# Patient Record
Sex: FEMALE | Race: WHITE | Employment: OTHER | URBAN - METROPOLITAN AREA
[De-identification: names, ages, dates, MRNs, and addresses within clinical notes are randomized per-mention and may not be internally consistent; named-entity substitution may affect disease eponyms.]

---

## 2019-04-29 ENCOUNTER — OFFICE VISIT (OUTPATIENT)
Dept: INFECTIOUS DISEASES | Age: 74
End: 2019-04-29
Payer: MEDICARE

## 2019-04-29 VITALS — HEART RATE: 64 BPM | SYSTOLIC BLOOD PRESSURE: 128 MMHG | DIASTOLIC BLOOD PRESSURE: 60 MMHG | TEMPERATURE: 98 F

## 2019-04-29 DIAGNOSIS — Z87.440 HISTORY OF RECURRENT UTIS: Primary | ICD-10-CM

## 2019-04-29 DIAGNOSIS — Z88.1 ALLERGY TO MULTIPLE ANTIBIOTICS: ICD-10-CM

## 2019-04-29 DIAGNOSIS — R82.71 BACTERIURIA: ICD-10-CM

## 2019-04-29 PROCEDURE — 3017F COLORECTAL CA SCREEN DOC REV: CPT | Performed by: INTERNAL MEDICINE

## 2019-04-29 PROCEDURE — G8427 DOCREV CUR MEDS BY ELIG CLIN: HCPCS | Performed by: INTERNAL MEDICINE

## 2019-04-29 PROCEDURE — 4004F PT TOBACCO SCREEN RCVD TLK: CPT | Performed by: INTERNAL MEDICINE

## 2019-04-29 PROCEDURE — 1123F ACP DISCUSS/DSCN MKR DOCD: CPT | Performed by: INTERNAL MEDICINE

## 2019-04-29 PROCEDURE — G8399 PT W/DXA RESULTS DOCUMENT: HCPCS | Performed by: INTERNAL MEDICINE

## 2019-04-29 PROCEDURE — 1090F PRES/ABSN URINE INCON ASSESS: CPT | Performed by: INTERNAL MEDICINE

## 2019-04-29 PROCEDURE — 99205 OFFICE O/P NEW HI 60 MIN: CPT | Performed by: INTERNAL MEDICINE

## 2019-04-29 PROCEDURE — G8421 BMI NOT CALCULATED: HCPCS | Performed by: INTERNAL MEDICINE

## 2019-04-29 PROCEDURE — 4040F PNEUMOC VAC/ADMIN/RCVD: CPT | Performed by: INTERNAL MEDICINE

## 2019-04-29 RX ORDER — ATORVASTATIN CALCIUM 20 MG/1
20 TABLET, FILM COATED ORAL DAILY
COMMUNITY

## 2019-04-29 RX ORDER — TRAZODONE HYDROCHLORIDE 50 MG/1
50 TABLET ORAL NIGHTLY
Status: ON HOLD | COMMUNITY
End: 2022-06-20

## 2019-04-29 NOTE — PROGRESS NOTES
Infectious Diseases Consult Note    Reason for Consult:   Recurrent UTI   Requesting Physician:   Urology - Dr Lyndsey Gonzales  Primary Care Physician:  Neysa Cheadle, MD  History Obtained From:   Patient, EPIC    CHIEF COMPLAINT:      Chief Complaint   Patient presents with    New Patient       HISTORY OF PRESENT ILLNESS:      73 yo HTN, high lipid, hx breast ca, DM, CVA 1/11/19 (after hip surg / [facility])    Hx UTIs over 3-4 years - no symptoms. May have frequency. No hx dysuria, hematuria. No abd pain / no back pain. Has had E coli, resistant. Has 'allergies', see below. Macrobid 'does not work'. Today - pt feels fine.     Reviewed antibiotic -  PCN - age 25, had cold, shot / pills, 'hives'  Cephalexin - rash on arm with injection, she thinks  Cipro - doesn't remember, may have had swelling in upper mid gingiva  Bactrim - doesn't remember; quit working      Past Medical History:    Past Medical History:   Diagnosis Date    Cancer ([facility] [address]     breast    Glaucoma     Hyperlipidemia     Hypertension     CAROLINA (nonalcoholic steatohepatitis)     PPD positive     Type II or unspecified type diabetes mellitus without mention of complication, not stated as uncontrolled ([address]        Past Surgical History:    Past Surgical History:   Procedure Laterality Date    APPENDECTOMY  14 years ago    BREAST BIOPSY  2000    left    BREAST SURGERY      bilateral mastectomy    COLONOSCOPY  8/30/2011    DILATION AND CURETTAGE OF UTERUS  1975    JOINT REPLACEMENT Right     hip    OTHER SURGICAL HISTORY  4/4/16    excision left lower abdominal wall mass, excision right lower leg mass    TONSILLECTOMY  5 years ago       Current Medications:    Current Outpatient Medications   Medication Sig Dispense Refill    aspirin 81 MG tablet Take 81 mg by mouth daily      traZODone (DESYREL) 50 MG tablet Take 50 mg by mouth nightly      atorvastatin (LIPITOR) 10 MG tablet Take 10 mg by mouth daily      hydrochlorothiazide (HYDRODIURIL)

## 2022-06-18 ENCOUNTER — APPOINTMENT (OUTPATIENT)
Dept: GENERAL RADIOLOGY | Age: 77
DRG: 189 | End: 2022-06-18
Payer: MEDICARE

## 2022-06-18 ENCOUNTER — APPOINTMENT (OUTPATIENT)
Dept: CT IMAGING | Age: 77
DRG: 189 | End: 2022-06-18
Payer: MEDICARE

## 2022-06-18 ENCOUNTER — HOSPITAL ENCOUNTER (INPATIENT)
Age: 77
LOS: 7 days | Discharge: NURSING, REHAB OR LONG-TERM CARE | DRG: 189 | End: 2022-06-25
Attending: EMERGENCY MEDICINE | Admitting: INTERNAL MEDICINE
Payer: MEDICARE

## 2022-06-18 DIAGNOSIS — R09.02 HYPOXEMIA: ICD-10-CM

## 2022-06-18 DIAGNOSIS — J18.9 COMMUNITY ACQUIRED PNEUMONIA, UNSPECIFIED LATERALITY: Primary | ICD-10-CM

## 2022-06-18 PROBLEM — J96.01 ACUTE RESPIRATORY FAILURE WITH HYPOXIA (HCC): Status: ACTIVE | Noted: 2022-06-18

## 2022-06-18 LAB
ANION GAP SERPL CALCULATED.3IONS-SCNC: 12 MMOL/L (ref 3–16)
BACTERIA: ABNORMAL /HPF
BASE EXCESS VENOUS: 9.4 MMOL/L (ref -2–3)
BASOPHILS ABSOLUTE: 0.1 K/UL (ref 0–0.2)
BASOPHILS RELATIVE PERCENT: 1.2 %
BILIRUBIN URINE: NEGATIVE
BLOOD, URINE: NEGATIVE
BUN BLDV-MCNC: 17 MG/DL (ref 7–20)
CALCIUM SERPL-MCNC: 9.7 MG/DL (ref 8.3–10.6)
CARBOXYHEMOGLOBIN: 2.1 % (ref 0–1.5)
CHLORIDE BLD-SCNC: 94 MMOL/L (ref 99–110)
CLARITY: CLEAR
CO2: 29 MMOL/L (ref 21–32)
COLOR: YELLOW
CREAT SERPL-MCNC: 0.7 MG/DL (ref 0.6–1.2)
D DIMER: 1.17 UG/ML FEU (ref 0–0.6)
EKG ATRIAL RATE: 90 BPM
EKG DIAGNOSIS: NORMAL
EKG P AXIS: 52 DEGREES
EKG P-R INTERVAL: 158 MS
EKG Q-T INTERVAL: 366 MS
EKG QRS DURATION: 86 MS
EKG QTC CALCULATION (BAZETT): 447 MS
EKG R AXIS: 37 DEGREES
EKG T AXIS: 54 DEGREES
EKG VENTRICULAR RATE: 90 BPM
EOSINOPHILS ABSOLUTE: 0.1 K/UL (ref 0–0.6)
EOSINOPHILS RELATIVE PERCENT: 1.3 %
EPITHELIAL CELLS, UA: ABNORMAL /HPF (ref 0–5)
GFR AFRICAN AMERICAN: >60
GFR NON-AFRICAN AMERICAN: >60
GLUCOSE BLD-MCNC: 119 MG/DL (ref 70–99)
GLUCOSE BLD-MCNC: 146 MG/DL (ref 70–99)
GLUCOSE BLD-MCNC: 189 MG/DL (ref 70–99)
GLUCOSE BLD-MCNC: 229 MG/DL (ref 70–99)
GLUCOSE URINE: NEGATIVE MG/DL
HCO3 VENOUS: 35.3 MMOL/L (ref 24–28)
HCT VFR BLD CALC: 38.7 % (ref 36–48)
HEMOGLOBIN, VEN, REDUCED: 36.2 %
HEMOGLOBIN: 12.9 G/DL (ref 12–16)
INFLUENZA A: NOT DETECTED
INFLUENZA B: NOT DETECTED
KETONES, URINE: NEGATIVE MG/DL
LACTIC ACID: 1.3 MMOL/L (ref 0.4–2)
LEUKOCYTE ESTERASE, URINE: NEGATIVE
LYMPHOCYTES ABSOLUTE: 1 K/UL (ref 1–5.1)
LYMPHOCYTES RELATIVE PERCENT: 14.2 %
MCH RBC QN AUTO: 26 PG (ref 26–34)
MCHC RBC AUTO-ENTMCNC: 33.4 G/DL (ref 31–36)
MCV RBC AUTO: 77.9 FL (ref 80–100)
METHEMOGLOBIN VENOUS: 0.3 % (ref 0–1.5)
MICROSCOPIC EXAMINATION: YES
MONOCYTES ABSOLUTE: 0.4 K/UL (ref 0–1.3)
MONOCYTES RELATIVE PERCENT: 5.8 %
NEUTROPHILS ABSOLUTE: 5.3 K/UL (ref 1.7–7.7)
NEUTROPHILS RELATIVE PERCENT: 77.5 %
NITRITE, URINE: POSITIVE
O2 SAT, VEN: 63 %
PCO2, VEN: 51.6 MMHG (ref 41–51)
PDW BLD-RTO: 16 % (ref 12.4–15.4)
PERFORMED ON: ABNORMAL
PH UA: 7.5 (ref 5–8)
PH VENOUS: 7.44 (ref 7.35–7.45)
PLATELET # BLD: 137 K/UL (ref 135–450)
PMV BLD AUTO: 7.1 FL (ref 5–10.5)
PO2, VEN: 33.6 MMHG (ref 25–40)
POTASSIUM REFLEX MAGNESIUM: 3.8 MMOL/L (ref 3.5–5.1)
PRO-BNP: 123 PG/ML (ref 0–449)
PROCALCITONIN: 0.05 NG/ML (ref 0–0.15)
PROCALCITONIN: 0.06 NG/ML (ref 0–0.15)
PROTEIN UA: ABNORMAL MG/DL
RBC # BLD: 4.97 M/UL (ref 4–5.2)
RBC UA: ABNORMAL /HPF (ref 0–4)
SARS-COV-2 RNA, RT PCR: NOT DETECTED
SODIUM BLD-SCNC: 135 MMOL/L (ref 136–145)
SPECIFIC GRAVITY UA: 1.01 (ref 1–1.03)
TCO2 CALC VENOUS: 37 MMOL/L
TROPONIN: <0.01 NG/ML
TSH REFLEX: 3.15 UIU/ML (ref 0.27–4.2)
URINE TYPE: ABNORMAL
UROBILINOGEN, URINE: 0.2 E.U./DL
WBC # BLD: 6.9 K/UL (ref 4–11)
WBC UA: ABNORMAL /HPF (ref 0–5)

## 2022-06-18 PROCEDURE — 70450 CT HEAD/BRAIN W/O DYE: CPT

## 2022-06-18 PROCEDURE — 81001 URINALYSIS AUTO W/SCOPE: CPT

## 2022-06-18 PROCEDURE — 85025 COMPLETE CBC W/AUTO DIFF WBC: CPT

## 2022-06-18 PROCEDURE — 87636 SARSCOV2 & INF A&B AMP PRB: CPT

## 2022-06-18 PROCEDURE — 80048 BASIC METABOLIC PNL TOTAL CA: CPT

## 2022-06-18 PROCEDURE — 6360000002 HC RX W HCPCS: Performed by: INTERNAL MEDICINE

## 2022-06-18 PROCEDURE — 85379 FIBRIN DEGRADATION QUANT: CPT

## 2022-06-18 PROCEDURE — 6370000000 HC RX 637 (ALT 250 FOR IP): Performed by: INTERNAL MEDICINE

## 2022-06-18 PROCEDURE — 2580000003 HC RX 258: Performed by: EMERGENCY MEDICINE

## 2022-06-18 PROCEDURE — 2580000003 HC RX 258: Performed by: INTERNAL MEDICINE

## 2022-06-18 PROCEDURE — 87040 BLOOD CULTURE FOR BACTERIA: CPT

## 2022-06-18 PROCEDURE — 84443 ASSAY THYROID STIM HORMONE: CPT

## 2022-06-18 PROCEDURE — 36415 COLL VENOUS BLD VENIPUNCTURE: CPT

## 2022-06-18 PROCEDURE — 93005 ELECTROCARDIOGRAM TRACING: CPT | Performed by: INTERNAL MEDICINE

## 2022-06-18 PROCEDURE — 82803 BLOOD GASES ANY COMBINATION: CPT

## 2022-06-18 PROCEDURE — 84145 PROCALCITONIN (PCT): CPT

## 2022-06-18 PROCEDURE — 72125 CT NECK SPINE W/O DYE: CPT

## 2022-06-18 PROCEDURE — 83880 ASSAY OF NATRIURETIC PEPTIDE: CPT

## 2022-06-18 PROCEDURE — 83605 ASSAY OF LACTIC ACID: CPT

## 2022-06-18 PROCEDURE — 84484 ASSAY OF TROPONIN QUANT: CPT

## 2022-06-18 PROCEDURE — 87186 SC STD MICRODIL/AGAR DIL: CPT

## 2022-06-18 PROCEDURE — 94150 VITAL CAPACITY TEST: CPT

## 2022-06-18 PROCEDURE — 96365 THER/PROPH/DIAG IV INF INIT: CPT

## 2022-06-18 PROCEDURE — 94761 N-INVAS EAR/PLS OXIMETRY MLT: CPT

## 2022-06-18 PROCEDURE — 87077 CULTURE AEROBIC IDENTIFY: CPT

## 2022-06-18 PROCEDURE — 6360000002 HC RX W HCPCS: Performed by: EMERGENCY MEDICINE

## 2022-06-18 PROCEDURE — 87086 URINE CULTURE/COLONY COUNT: CPT

## 2022-06-18 PROCEDURE — 83036 HEMOGLOBIN GLYCOSYLATED A1C: CPT

## 2022-06-18 PROCEDURE — 71045 X-RAY EXAM CHEST 1 VIEW: CPT

## 2022-06-18 PROCEDURE — 1200000000 HC SEMI PRIVATE

## 2022-06-18 PROCEDURE — 99285 EMERGENCY DEPT VISIT HI MDM: CPT

## 2022-06-18 RX ORDER — ONDANSETRON 4 MG/1
4 TABLET, ORALLY DISINTEGRATING ORAL EVERY 8 HOURS PRN
Status: DISCONTINUED | OUTPATIENT
Start: 2022-06-18 | End: 2022-06-25 | Stop reason: HOSPADM

## 2022-06-18 RX ORDER — ACETAMINOPHEN 325 MG/1
650 TABLET ORAL EVERY 6 HOURS PRN
Status: DISCONTINUED | OUTPATIENT
Start: 2022-06-18 | End: 2022-06-18 | Stop reason: SDUPTHER

## 2022-06-18 RX ORDER — B COMPLX/C/FOLIC/ZINC/CUP SU/E 500-0.4 MG
TABLET ORAL
Status: ON HOLD | COMMUNITY
End: 2022-06-20

## 2022-06-18 RX ORDER — INSULIN LISPRO 100 [IU]/ML
0-3 INJECTION, SOLUTION INTRAVENOUS; SUBCUTANEOUS NIGHTLY
Status: DISCONTINUED | OUTPATIENT
Start: 2022-06-18 | End: 2022-06-25 | Stop reason: HOSPADM

## 2022-06-18 RX ORDER — SODIUM CHLORIDE 0.9 % (FLUSH) 0.9 %
5-40 SYRINGE (ML) INJECTION EVERY 12 HOURS SCHEDULED
Status: DISCONTINUED | OUTPATIENT
Start: 2022-06-18 | End: 2022-06-25 | Stop reason: HOSPADM

## 2022-06-18 RX ORDER — TIMOLOL MALEATE 5 MG/ML
1 SOLUTION/ DROPS OPHTHALMIC DAILY
Status: DISCONTINUED | OUTPATIENT
Start: 2022-06-18 | End: 2022-06-25 | Stop reason: HOSPADM

## 2022-06-18 RX ORDER — POLYETHYLENE GLYCOL 3350 17 G/17G
17 POWDER, FOR SOLUTION ORAL 2 TIMES DAILY
Status: DISCONTINUED | OUTPATIENT
Start: 2022-06-18 | End: 2022-06-25 | Stop reason: HOSPADM

## 2022-06-18 RX ORDER — LEVOTHYROXINE SODIUM 112 UG/1
112 TABLET ORAL
COMMUNITY
Start: 2022-02-01

## 2022-06-18 RX ORDER — ONDANSETRON 2 MG/ML
4 INJECTION INTRAMUSCULAR; INTRAVENOUS EVERY 6 HOURS PRN
Status: DISCONTINUED | OUTPATIENT
Start: 2022-06-18 | End: 2022-06-25 | Stop reason: HOSPADM

## 2022-06-18 RX ORDER — DEXTROSE MONOHYDRATE 50 MG/ML
100 INJECTION, SOLUTION INTRAVENOUS PRN
Status: DISCONTINUED | OUTPATIENT
Start: 2022-06-18 | End: 2022-06-25 | Stop reason: HOSPADM

## 2022-06-18 RX ORDER — ACETAMINOPHEN 650 MG/1
650 SUPPOSITORY RECTAL EVERY 6 HOURS PRN
Status: DISCONTINUED | OUTPATIENT
Start: 2022-06-18 | End: 2022-06-18 | Stop reason: SDUPTHER

## 2022-06-18 RX ORDER — LATANOPROST 50 UG/ML
1 SOLUTION/ DROPS OPHTHALMIC NIGHTLY
Status: DISCONTINUED | OUTPATIENT
Start: 2022-06-18 | End: 2022-06-25 | Stop reason: HOSPADM

## 2022-06-18 RX ORDER — DOXYCYCLINE 100 MG/1
100 CAPSULE ORAL EVERY 12 HOURS SCHEDULED
Status: DISCONTINUED | OUTPATIENT
Start: 2022-06-18 | End: 2022-06-19

## 2022-06-18 RX ORDER — SODIUM CHLORIDE 0.9 % (FLUSH) 0.9 %
5-40 SYRINGE (ML) INJECTION PRN
Status: DISCONTINUED | OUTPATIENT
Start: 2022-06-18 | End: 2022-06-25 | Stop reason: HOSPADM

## 2022-06-18 RX ORDER — ACETAMINOPHEN 325 MG/1
650 TABLET ORAL EVERY 4 HOURS PRN
Status: DISCONTINUED | OUTPATIENT
Start: 2022-06-18 | End: 2022-06-25 | Stop reason: HOSPADM

## 2022-06-18 RX ORDER — SODIUM CHLORIDE 9 MG/ML
INJECTION, SOLUTION INTRAVENOUS PRN
Status: DISCONTINUED | OUTPATIENT
Start: 2022-06-18 | End: 2022-06-25 | Stop reason: HOSPADM

## 2022-06-18 RX ORDER — ENOXAPARIN SODIUM 100 MG/ML
40 INJECTION SUBCUTANEOUS DAILY
Status: DISCONTINUED | OUTPATIENT
Start: 2022-06-18 | End: 2022-06-25 | Stop reason: HOSPADM

## 2022-06-18 RX ORDER — POLYETHYLENE GLYCOL 3350 17 G/17G
17 POWDER, FOR SOLUTION ORAL DAILY PRN
Status: DISCONTINUED | OUTPATIENT
Start: 2022-06-18 | End: 2022-06-18

## 2022-06-18 RX ORDER — BISACODYL 10 MG
10 SUPPOSITORY, RECTAL RECTAL DAILY PRN
Status: DISCONTINUED | OUTPATIENT
Start: 2022-06-18 | End: 2022-06-25 | Stop reason: HOSPADM

## 2022-06-18 RX ORDER — DIPHENHYDRAMINE HYDROCHLORIDE 50 MG/ML
25 INJECTION INTRAMUSCULAR; INTRAVENOUS EVERY 6 HOURS PRN
Status: DISCONTINUED | OUTPATIENT
Start: 2022-06-18 | End: 2022-06-25 | Stop reason: HOSPADM

## 2022-06-18 RX ORDER — INSULIN LISPRO 100 [IU]/ML
0-6 INJECTION, SOLUTION INTRAVENOUS; SUBCUTANEOUS
Status: DISCONTINUED | OUTPATIENT
Start: 2022-06-18 | End: 2022-06-25 | Stop reason: HOSPADM

## 2022-06-18 RX ADMIN — POLYETHYLENE GLYCOL 3350 17 G: 17 POWDER, FOR SOLUTION ORAL at 21:43

## 2022-06-18 RX ADMIN — SODIUM CHLORIDE, PRESERVATIVE FREE 10 ML: 5 INJECTION INTRAVENOUS at 12:13

## 2022-06-18 RX ADMIN — SODIUM CHLORIDE, PRESERVATIVE FREE 10 ML: 5 INJECTION INTRAVENOUS at 21:45

## 2022-06-18 RX ADMIN — POLYETHYLENE GLYCOL 3350 17 G: 17 POWDER, FOR SOLUTION ORAL at 15:58

## 2022-06-18 RX ADMIN — ENOXAPARIN SODIUM 40 MG: 100 INJECTION SUBCUTANEOUS at 12:13

## 2022-06-18 RX ADMIN — LATANOPROST 1 DROP: 50 SOLUTION OPHTHALMIC at 23:05

## 2022-06-18 RX ADMIN — DOXYCYCLINE 100 MG: 100 CAPSULE ORAL at 21:43

## 2022-06-18 RX ADMIN — AZITHROMYCIN DIHYDRATE 500 MG: 500 INJECTION, POWDER, LYOPHILIZED, FOR SOLUTION INTRAVENOUS at 06:51

## 2022-06-18 RX ADMIN — INSULIN GLARGINE 10 UNITS: 100 INJECTION, SOLUTION SUBCUTANEOUS at 23:05

## 2022-06-18 RX ADMIN — ACETAMINOPHEN 650 MG: 325 TABLET ORAL at 15:58

## 2022-06-18 ASSESSMENT — PAIN DESCRIPTION - ONSET
ONSET: ON-GOING

## 2022-06-18 ASSESSMENT — PAIN DESCRIPTION - DESCRIPTORS
DESCRIPTORS_2: ACHING
DESCRIPTORS: ACHING

## 2022-06-18 ASSESSMENT — PAIN DESCRIPTION - ORIENTATION
ORIENTATION_2: LOWER;RIGHT;LEFT
ORIENTATION: RIGHT;LEFT;LOWER
ORIENTATION: UPPER;POSTERIOR
ORIENTATION: UPPER

## 2022-06-18 ASSESSMENT — PAIN - FUNCTIONAL ASSESSMENT
PAIN_FUNCTIONAL_ASSESSMENT: ACTIVITIES ARE NOT PREVENTED
PAIN_FUNCTIONAL_ASSESSMENT: ACTIVITIES ARE NOT PREVENTED
PAIN_FUNCTIONAL_ASSESSMENT_SITE2: ACTIVITIES ARE NOT PREVENTED
PAIN_FUNCTIONAL_ASSESSMENT: ACTIVITIES ARE NOT PREVENTED

## 2022-06-18 ASSESSMENT — PAIN DESCRIPTION - PAIN TYPE
TYPE: ACUTE PAIN
TYPE: CHRONIC PAIN
TYPE: ACUTE PAIN

## 2022-06-18 ASSESSMENT — PAIN DESCRIPTION - INTENSITY: RATING_2: 4

## 2022-06-18 ASSESSMENT — PAIN SCALES - GENERAL
PAINLEVEL_OUTOF10: 0
PAINLEVEL_OUTOF10: 5
PAINLEVEL_OUTOF10: 3
PAINLEVEL_OUTOF10: 2

## 2022-06-18 ASSESSMENT — PAIN DESCRIPTION - FREQUENCY
FREQUENCY: CONTINUOUS

## 2022-06-18 ASSESSMENT — PAIN DESCRIPTION - LOCATION
LOCATION: HEAD
LOCATION: HEAD
LOCATION_2: BACK
LOCATION: BACK

## 2022-06-18 NOTE — ED NOTES
Pt removed from bed pan. Did not have BM. Pt voids about 200 ml clear malodorous urine. Breakfast tray set up for patient. Will call report to floor.       Chandler Villa RN  06/18/22 2655

## 2022-06-18 NOTE — PLAN OF CARE
Problem: Discharge Planning  Goal: Discharge to home or other facility with appropriate resources  Outcome: Progressing  Flowsheets (Taken 6/18/2022 1508)  Discharge to home or other facility with appropriate resources:   Identify barriers to discharge with patient and caregiver   Arrange for needed discharge resources and transportation as appropriate   Identify discharge learning needs (meds, wound care, etc)   Refer to discharge planning if patient needs post-hospital services based on physician order or complex needs related to functional status, cognitive ability or social support system     Problem: Pain  Goal: Verbalizes/displays adequate comfort level or baseline comfort level  Outcome: Progressing  Flowsheets (Taken 6/18/2022 1508)  Verbalizes/displays adequate comfort level or baseline comfort level:   Encourage patient to monitor pain and request assistance   Assess pain using appropriate pain scale   Administer analgesics based on type and severity of pain and evaluate response   Implement non-pharmacological measures as appropriate and evaluate response   Consider cultural and social influences on pain and pain management     Problem: Safety - Adult  Goal: Free from fall injury  Outcome: Progressing     Problem: Skin/Tissue Integrity  Goal: Absence of new skin breakdown  Description: 1. Monitor for areas of redness and/or skin breakdown  2. Assess vascular access sites hourly  3. Every 4-6 hours minimum:  Change oxygen saturation probe site  4. Every 4-6 hours:  If on nasal continuous positive airway pressure, respiratory therapy assess nares and determine need for appliance change or resting period.   Outcome: Progressing

## 2022-06-18 NOTE — ED PROVIDER NOTES
[address] [facility] ENCOUNTER          ATTENDING PHYSICIAN NOTE       Date of evaluation: 6/18/2022    Chief Complaint     Fall and Head Injury      History of Present Illness     Gilman Essex is a 64 y.o. female with a PMH as described below who presents to the ED today with a chief complaint of: Fall. Patient presents from a nursing facility after reported fall. Was reportedly tried to transition from her bed to her walker and felt shaky and fell backward hitting her head, left arm, and right shin. She reportedly did not blackout or lose consciousness. She is not on any blood thinners. At time of my initial evaluation she was 86% on room air. She was placed on 3 L nasal cannula oxygen with improvement in oxygen saturation to 92-94%. She denies any history of oxygen usage. She does not have any chest pain or shortness of breath. She notes a history of prior CVA following a hip arthroplasty in 2019 and chronic edema of her right lower extremity for which she is reportedly on compression stockings and 25 mg of hydrochlorothiazide. She also has a history of hypothyroidism, type 2 diabetes, thrombocytopenia. No known history of heart failure. The patientstates that there were no other associated signs and symptoms or modifying factors. Patient rates pain as 4/10. Review of Systems     As described above in the HPI otherwise all the systems reviewed and negative as reported by the patient. Past Medical, Surgical, Family, and Social History     [facility] has a [facility] Cerebral artery occlusion with cerebral infarction ([facility] [address] Glaucoma, Hyperlipidemia, Hypertension, CAROLINA (nonalcoholic steatohepatitis), PPD positive, Thyroid disease, and Type II or unspecified type diabetes mellitus without mention of complication, not stated as uncontrolled. She has a past surgical history that includes Tonsillectomy (5 years ago);  Appendectomy (14 years ago); Dilation and curettage of uterus (1975); Breast surgery; Breast biopsy (2000); Colonoscopy (8/30/2011); joint replacement (Right); other surgical history (4/4/16); and Mastectomy, bilateral.  Her family history includes Cancer in her maternal aunt, maternal grandmother, paternal aunt, and paternal grandmother; Diabetes in her maternal grandfather; Heart Disease in her paternal grandfather; High Blood Pressure in her father; Other in her mother. She reports that she quit smoking about 15 years ago. She has never used smokeless tobacco. She reports current alcohol use. She reports that she does not use drugs. Medications     Previous Medications    ASPIRIN 81 MG TABLET    Take 81 mg by mouth daily    ATORVASTATIN (LIPITOR) 10 MG TABLET    Take 10 mg by mouth daily    CALCIUM CARBONATE-VITAMIN D (CALTRATE 600+D) 600-400 MG-UNIT TABS    Take 1 tablet by mouth 2 times daily. GEMFIBROZIL (LOPID) 600 MG TABLET    Take 1 tablet by mouth 2 times daily. HYDROCHLOROTHIAZIDE (HYDRODIURIL) 25 MG TABLET    TAKE ONE TABLET BY MOUTH EVERY DAY    LATANOPROST (XALATAN) 0.005 % OPHTHALMIC SOLUTION    Place 1 drop into both eyes nightly. LISINOPRIL (PRINIVIL;ZESTRIL) 10 MG TABLET    TAKE TWO TABLETS BY MOUTH EVERY DAY    MELOXICAM (MOBIC) 7.5 MG TABLET    Take 1 tablet by mouth daily. MELOXICAM (MOBIC) 7.5 MG TABLET    Take 1 tablet by mouth 2 times daily (with meals). METFORMIN (GLUCOPHAGE) 500 MG TABLET    Take 1 tablet by mouth daily (with breakfast). OXYCODONE-ACETAMINOPHEN (PERCOCET) 5-325 MG PER TABLET    Take 1 tablet by mouth every 6 hours as needed for Pain    TIMOLOL (TIMOPTIC) 0.5 % OPHTHALMIC SOLUTION    Place 1 drop into both eyes daily.     TRAZODONE (DESYREL) 50 MG TABLET    Take 50 mg by mouth nightly       Allergies     She is allergic to adhesive tape, bactrim [sulfamethoxazole-trimethoprim], cephalexin, ciprofloxacin, florinef [fludrocortisone], other, penicillins, sulfa antibiotics, trimethoprim, and vancomycin. Physical Exam     INITIAL VITALS: BP: (!) 155/78, Temp: 99 °F (37.2 °C), Heart Rate: 87, Resp: 21, SpO2: (!) 87 %   Physical Exam  Vitals and nursing note reviewed. Constitutional:       Appearance: Normal appearance. She is normal weight. HENT:      Head: Normocephalic and atraumatic. Nose: Nose normal.      Mouth/Throat:      Mouth: Mucous membranes are dry. Eyes:      Extraocular Movements: Extraocular movements intact. Pupils: Pupils are equal, round, and reactive to light. Cardiovascular:      Rate and Rhythm: Normal rate and regular rhythm. Pulses: Normal pulses. Heart sounds: Normal heart sounds. Pulmonary:      Effort: Pulmonary effort is normal.      Breath sounds: Normal breath sounds. Abdominal:      General: There is no distension. Tenderness: There is no abdominal tenderness. Musculoskeletal:      Cervical back: Normal range of motion and neck supple. Right lower leg: Edema present. Left lower leg: Edema present. Comments: Edema to the bilateral lower extremities right greater than left. Skin:     General: Skin is warm. Capillary Refill: Capillary refill takes less than 2 seconds. Neurological:      Mental Status: She is alert and oriented to person, place, and time. Mental status is at baseline. Psychiatric:         Mood and Affect: Mood normal.         DiagnosticResults     EKG   Indication: Hypoxia: Ventricular rate 90, MS interval 158, QRS 86, QTc 447, axis 37 degrees. Normal sinus rhythm. No acute ST-T wave elevations or depressions concerning for acute ischemia or infarct. No significant change compared to prior EKG performed in 2014. RADIOLOGY:  CT HEAD WO CONTRAST   Final Result      Cerebral atrophy, and evidence of mild chronic small vessel white matter disease. Remote left frontal infarct. No acute intracranial findings.             CT CERVICAL SPINE WO CONTRAST   Final Result Multilevel degenerative changes in the cervical spine. No acute findings. XR CHEST PORTABLE   Final Result      Mild bibasilar airspace density consistent with early infiltrates or atelectasis.              LABS:   Results for orders placed or performed during the hospital encounter of 06/18/22   COVID-19 & Influenza Combo   Result Value Ref Range    SARS-CoV-2 RNA, RT PCR NOT DETECTED NOT DETECTED    INFLUENZA A NOT DETECTED NOT DETECTED    INFLUENZA B NOT DETECTED NOT DETECTED   CBC with Auto Differential   Result Value Ref Range    WBC 6.9 4.0 - 11.0 K/uL    RBC 4.97 4.00 - 5.20 M/uL    Hemoglobin 12.9 12.0 - 16.0 g/dL    Hematocrit 38.7 36.0 - 48.0 %    MCV 77.9 (L) 80.0 - 100.0 fL    MCH 26.0 26.0 - 34.0 pg    MCHC 33.4 31.0 - 36.0 g/dL    RDW 16.0 (H) 12.4 - 15.4 %    Platelets 125 521 - 995 K/uL    MPV 7.1 5.0 - 10.5 fL    Neutrophils % 77.5 %    Lymphocytes % 14.2 %    Monocytes % 5.8 %    Eosinophils % 1.3 %    Basophils % 1.2 %    Neutrophils Absolute 5.3 1.7 - 7.7 K/uL    Lymphocytes Absolute 1.0 1.0 - 5.1 K/uL    Monocytes Absolute 0.4 0.0 - 1.3 K/uL    Eosinophils Absolute 0.1 0.0 - 0.6 K/uL    Basophils Absolute 0.1 0.0 - 0.2 K/uL   Basic Metabolic Panel w/ Reflex to MG   Result Value Ref Range    Sodium 135 (L) 136 - 145 mmol/L    Potassium reflex Magnesium 3.8 3.5 - 5.1 mmol/L    Chloride 94 (L) 99 - 110 mmol/L    CO2 29 21 - 32 mmol/L    Anion Gap 12 3 - 16    Glucose 229 (H) 70 - 99 mg/dL    BUN 17 7 - 20 mg/dL    CREATININE 0.7 0.6 - 1.2 mg/dL    GFR Non-African American >60 >60    GFR African American >60 >60    Calcium 9.7 8.3 - 10.6 mg/dL   Blood gas, venous (Lab)   Result Value Ref Range    pH, Lyle 7.443 7.350 - 7.450    pCO2, Lyle 51.6 (H) 41.0 - 51.0 mmHg    pO2, Lyle 33.6 25.0 - 40.0 mmHg    HCO3, Venous 35.3 (H) 24.0 - 28.0 mmol/L    Base Excess, Lyle 9.4 (H) -2.0 - 3.0 mmol/L    O2 Sat, Lyle 63 Not established %    Carboxyhemoglobin 2.1 (H) 0.0 - 1.5 %    MetHgb, Lyle 0.3 0.0 - 1.5 %    TC02 (Calc), Lyle 37 mmol/L    Hemoglobin, Lyle, Reduced 36.20 %   Troponin   Result Value Ref Range    Troponin <0.01 <0.01 ng/mL   Brain Natriuretic Peptide   Result Value Ref Range    Pro- 0 - 449 pg/mL   Urinalysis   Result Value Ref Range    Color, UA Yellow Straw/Yellow    Clarity, UA Clear Clear    Glucose, Ur Negative Negative mg/dL    Bilirubin Urine Negative Negative    Ketones, Urine Negative Negative mg/dL    Specific Gravity, UA 1.015 1.005 - 1.030    Blood, Urine Negative Negative    pH, UA 7.5 5.0 - 8.0    Protein, UA TRACE (A) Negative mg/dL    Urobilinogen, Urine 0.2 <2.0 E.U./dL    Nitrite, Urine POSITIVE (A) Negative    Leukocyte Esterase, Urine Negative Negative    Microscopic Examination YES     Urine Type NotGiven    Lactic Acid   Result Value Ref Range    Lactic Acid 1.3 0.4 - 2.0 mmol/L   Microscopic Urinalysis   Result Value Ref Range    WBC, UA 3-5 0 - 5 /HPF    RBC, UA None seen 0 - 4 /HPF    Epithelial Cells, UA 2-5 0 - 5 /HPF    Bacteria, UA 4+ (A) None Seen /HPF       ED BEDSIDE ULTRASOUND:      RECENT VITALS:  BP: 133/74, Temp: 99 °F (37.2 °C),Heart Rate: 77, Resp: 20, SpO2: 93 %     Procedures         ED Course     Nursing Notes, Past Medical Hx, Past Surgical Hx, Social Hx, Allergies, and Family Hx werereviewed. The patient was given thefollowing medications:  Orders Placed This Encounter   Medications    azithromycin (ZITHROMAX) 500 mg in dextrose 5 % 250 mL IVPB     Order Specific Question:   Antimicrobial Indications     Answer:   Pneumonia (CAP)       CONSULTS:  None    MEDICAL DECISION MAKING / ASSESSMENT / April Ivanna is a 64 y.o. female on examination the patient was initially hypoxic and required initially 3 L of supplemental oxygen to get her oxygen saturation above 90%. It was able to be weaned down to 2 L. Even with 2 L her oxygen saturation is 90 to have a 94%. She has pitting edema to her bilateral lower extremities right greater than left. No known history of heart failure however she is on hydrochlorothiazide. She reports a fall stating that she felt very shaky while trying to transition from her bed to her walker. She reportedly hit the back of her head but does not have any obvious hematoma or laceration. She also complains of mild pain to her left elbow which she has a mild abrasion in her right shin. Laboratory studies are only remarkable for nitrite positive urine however she has no symptoms of dysuria in the last time her urine had nitrate positive findings was in 2013 which cultured out to be negative. However, given her hypoxia, her chest x-ray showing possible early infiltrate, I believe she would benefit from admission for further management of possible early pneumonia. She has multiple drug allergies including cephalosporins, fluoroquinolones complicating antibiotic choices. I will start her on azithromycin for now however she may need ID consult for further antibiotic therapy if her symptoms or not improving. She was admitted for further management      Clinical Impression     1. Community acquired pneumonia, unspecified laterality    2. Hypoxemia        Disposition     PATIENT REFERRED TO:  No follow-up provider specified.     DISCHARGE MEDICATIONS:  New Prescriptions    No medications on file       DISPOSITION Decision To Admit 06/18/2022 06:26:43 AM         Birgit Henriquez MD  06/18/22 2501

## 2022-06-18 NOTE — H&P
administration in time range)   polyethylene glycol (GLYCOLAX) packet 17 g (has no administration in time range)   acetaminophen (TYLENOL) tablet 650 mg (has no administration in time range)     Or   acetaminophen (TYLENOL) suppository 650 mg (has no administration in time range)   azithromycin (ZITHROMAX) 500 mg in dextrose 5 % 250 mL IVPB (0 mg IntraVENous Stopped 6/18/22 0751)         Past Medical History:        Diagnosis Date    Cancer ([facility] [address]     breast    Cerebral artery occlusion with cerebral infarction ([facility] [address]     Glaucoma     Hyperlipidemia     Hypertension     CAROLINA (nonalcoholic steatohepatitis)     PPD positive     Thyroid disease     Type II or unspecified type diabetes mellitus without mention of complication, not stated as uncontrolled        Past Surgical History:        Procedure Laterality Date    APPENDECTOMY  14 years ago    BREAST BIOPSY  2000    left    BREAST SURGERY      bilateral mastectomy    COLONOSCOPY  8/30/2011    DILATION AND CURETTAGE OF UTERUS  1975    JOINT REPLACEMENT Right     hip    MASTECTOMY, BILATERAL      OTHER SURGICAL HISTORY  4/4/16    excision left lower abdominal wall mass, excision right lower leg mass    TONSILLECTOMY  5 years ago       Medications Prior to Admission:    Medications Prior to Admission: levothyroxine (SYNTHROID) 112 MCG tablet, Take 1 tablet daily 6 days of the week. Take 2 tablets together on the remaining day. Multiple Vitamins-Minerals (MULTIVITAMIN/ZINC STRESS) TABS, Take by mouth  aspirin 81 MG tablet, Take 81 mg by mouth daily  traZODone (DESYREL) 50 MG tablet, Take 50 mg by mouth nightly (Patient not taking: Reported on 6/18/2022)  atorvastatin (LIPITOR) 10 MG tablet, Take 20 mg by mouth daily   oxyCODONE-acetaminophen (PERCOCET) 5-325 MG per tablet, Take 1 tablet by mouth every 6 hours as needed for Pain  gemfibrozil (LOPID) 600 MG tablet, Take 1 tablet by mouth 2 times daily.   hydrochlorothiazide (HYDRODIURIL) 25 MG tablet, TAKE ONE TABLET BY MOUTH EVERY DAY  lisinopril (PRINIVIL;ZESTRIL) 10 MG tablet, TAKE TWO TABLETS BY MOUTH EVERY DAY (Patient taking differently: Take 10 mg by mouth 2 times daily TAKE TWO TABLETS BY MOUTH EVERY DAY)  metFORMIN (GLUCOPHAGE) 500 MG tablet, Take 1 tablet by mouth daily (with breakfast). (Patient taking differently: Take 1,000 mg by mouth 2 times daily (with meals) )  latanoprost (XALATAN) 0.005 % ophthalmic solution, Place 1 drop into both eyes nightly. meloxicam (MOBIC) 7.5 MG tablet, Take 1 tablet by mouth 2 times daily (with meals). meloxicam (MOBIC) 7.5 MG tablet, Take 1 tablet by mouth daily. timolol (TIMOPTIC) 0.5 % ophthalmic solution, Place 1 drop into both eyes daily. Calcium Carbonate-Vitamin D (CALTRATE 600+D) 600-400 MG-UNIT TABS, Take 1 tablet by mouth 2 times daily. Allergies:  Adhesive tape, Bactrim [sulfamethoxazole-trimethoprim], Cephalexin, Ciprofloxacin, Florinef [fludrocortisone], Other, Penicillins, Sulfa antibiotics, Trimethoprim, and Vancomycin    Social History:   TOBACCO:   reports that she quit smoking about 15 years ago. She has never used smokeless tobacco.  ETOH:   reports current alcohol use.   Patient currently lives in a nursing home    Family History:       Problem Relation Age of Onset    Other Mother         [place] Disease    High Blood Pressure Father     Cancer Maternal Aunt     Cancer Paternal Aunt     Cancer Maternal Grandmother     Diabetes Maternal Grandfather     Cancer Paternal Grandmother     Heart Disease Paternal Grandfather        REVIEW OF SYSTEMS:  10 point ROS obtained, as per HPI, otherwise NEG    PHYSICAL EXAM:  /74   Pulse 85   Temp 97.7 °F (36.5 °C) (Oral)   Resp 17   Ht 5' 4\" (1.626 m)   Wt 174 lb (78.9 kg)   LMP  (LMP Unknown)   SpO2 93%   BMI 29.87 kg/m²   General appearance: alert, cooperative and appears in no distress  Head: Normocephalic, without obvious abnormality, atraumatic  Eyes: conjunctivae/corneas clear, EOM's intact. Ears: normal external  ears  Neck: no JVD, supple,   Lungs: clear to auscultation bilaterally  Heart: regular rate and rhythm, S1, S2 normal,    Abdomen:soft, NT,ND, BS+  Extremities:Pedal edema  Rt LE  Skin: Skin color, texture, turgor normal. No rashes or lesions  Neurologic: Alert and oriented X 3,RLE plegia 3/5    DATA:    CBC with Differential:    Lab Results   Component Value Date    WBC 6.9 06/18/2022    RBC 4.97 06/18/2022    HGB 12.9 06/18/2022    HCT 38.7 06/18/2022     06/18/2022    MCV 77.9 06/18/2022    SEGSPCT 60 11/16/2013    LYMPHOPCT 14.2 06/18/2022    EOSPCT 2 04/20/2012     BMP:    Lab Results   Component Value Date     06/18/2022    K 3.8 06/18/2022    CL 94 06/18/2022    CO2 29 06/18/2022    BUN 17 06/18/2022    CREATININE 0.7 06/18/2022    CALCIUM 9.7 06/18/2022    GFRAA >60 06/18/2022    GFRAA 103 04/20/2012    LABGLOM >60 06/18/2022    GLUCOSE 229 06/18/2022    GLUCOSE 95 04/20/2012            ASSESSMENT / PLAN:     Pneumonia,bacterial  -ct empiric  antibx given hypoxia  -Procalcitonin,BNP anne as no leukocytosis  -Pulm toilet  Doxycycline x 5 days    Hypoxia-on 3 Liters  -ct O2 supplement and wean as tolerated  -Echo ordered  BNP normal      Fall-sec to generalized debility,possible orthostasis  Check orthostatic vitals  PT/OT    DM2,controlled  accuchecks qac and hs  SSI    Essential hypertension, benign  Ct home meds-HCTZ and lisinopril    BLE edema-chr and sec to venouis stasis  BNP,echo  Compression socks as previous       Spinal stenosis/   Fibromyalgia  Ct pain meds    Hypothyroidism  -on synthroid  TSH ordered      Hx of CVA with residual hemiplegia   Neg CT head  Pt wheel chair bound but able to transfer  PT/OT       Chronic nonalcoholic liver disease,assoc pedal edema  Baseline      Disposition:PT/OT  DC IN 2 DAYS    Tells me code status-DNR CCA    ____________________________  N. MD Barry  Hospitalist Service

## 2022-06-18 NOTE — FLOWSHEET NOTE
C/O: Fall when moving from edge of bed to wheelchair. Patient stood up and felt general weakness. Patient hit the back of her head on the floor. No LOC, No blood thinners.

## 2022-06-18 NOTE — ED NOTES
Pt placed on bed pan for bowel movement. Call light in reach.  Called about breakfast tray delay and staff states it will be here shortly     Iris Bryant RN  06/18/22 1944

## 2022-06-18 NOTE — ED NOTES
Patient found to be incontinent of urine in brief. Patients brief changed and placed on pure-wick for urine collection. Patient given two warm blankets. Call bell in reach. Pending CT results at this time.       Jaime Guadalupe RN  06/18/22 0286

## 2022-06-18 NOTE — PROGRESS NOTES
4 Eyes Admission Assessment     I agree as the admission nurse that 2 RN's have performed a thorough Head to Toe Skin Assessment on the patient. ALL assessment sites listed below have been assessed on admission. Areas assessed by both nurses: Marco A Humphrey RN and Marcos Hubbard RN  [x]   Head, Face, and Ears   [x]   Shoulders, Back, and Chest  [x]   Arms, Elbows, and Hands   [x]   Coccyx, Sacrum, and Ischium  [x]   Legs, Feet, and Heels        Scattered briusing   Abrasion on left elbow   Blanchable redness on coccyx/sacral area  RLE +2 pitting edema   Blanchable redness on bilat heels     Does the Patient have Skin Breakdown?   No         Rikki Prevention initiated:  Yes   Wound Care Orders initiated:  NA      [facility] nurse consulted for Pressure Injury (Stage 3,4, Unstageable, DTI, NWPT, and Complex wounds) or Rikki score 18 or lower:  NA      Nurse 1 eSignature: Electronically signed by Nahomy Stallings RN on 6/18/22 at 10:59 AM EDT    **SHARE this note so that the co-signing nurse is able to place an eSignature**    Nurse 2 eSignature: Electronically signed by Genia Vines RN on 6/18/22 at 1:14 PM EDT

## 2022-06-18 NOTE — ED NOTES
Report given to Princess Harlan RN taking over pt care.  Pt to room 4306     Jason Lozano RN  06/18/22 2064

## 2022-06-18 NOTE — PROGRESS NOTES
Orthostatic BP & HR:  Lying - /74, HR 83  Sitting - /78, HR 87  Pt refused standing BP - pt reported she is unable to stand due to R sided weakness.  MD made aware

## 2022-06-19 ENCOUNTER — APPOINTMENT (OUTPATIENT)
Dept: CT IMAGING | Age: 77
DRG: 189 | End: 2022-06-19
Payer: MEDICARE

## 2022-06-19 LAB
ANION GAP SERPL CALCULATED.3IONS-SCNC: 9 MMOL/L (ref 3–16)
BASOPHILS ABSOLUTE: 0 K/UL (ref 0–0.2)
BASOPHILS RELATIVE PERCENT: 0.5 %
BUN BLDV-MCNC: 13 MG/DL (ref 7–20)
CALCIUM SERPL-MCNC: 8.5 MG/DL (ref 8.3–10.6)
CHLORIDE BLD-SCNC: 98 MMOL/L (ref 99–110)
CO2: 31 MMOL/L (ref 21–32)
CREAT SERPL-MCNC: 0.6 MG/DL (ref 0.6–1.2)
EOSINOPHILS ABSOLUTE: 0.1 K/UL (ref 0–0.6)
EOSINOPHILS RELATIVE PERCENT: 1.3 %
ESTIMATED AVERAGE GLUCOSE: 148.5 MG/DL
GFR AFRICAN AMERICAN: >60
GFR NON-AFRICAN AMERICAN: >60
GLUCOSE BLD-MCNC: 126 MG/DL (ref 70–99)
GLUCOSE BLD-MCNC: 145 MG/DL (ref 70–99)
GLUCOSE BLD-MCNC: 152 MG/DL (ref 70–99)
GLUCOSE BLD-MCNC: 163 MG/DL (ref 70–99)
GLUCOSE BLD-MCNC: 188 MG/DL (ref 70–99)
HBA1C MFR BLD: 6.8 %
HCT VFR BLD CALC: 39.5 % (ref 36–48)
HEMOGLOBIN: 12.8 G/DL (ref 12–16)
IRON SATURATION: 19 % (ref 15–50)
IRON: 52 UG/DL (ref 37–145)
LYMPHOCYTES ABSOLUTE: 1.3 K/UL (ref 1–5.1)
LYMPHOCYTES RELATIVE PERCENT: 20.9 %
MCH RBC QN AUTO: 25.4 PG (ref 26–34)
MCHC RBC AUTO-ENTMCNC: 32.4 G/DL (ref 31–36)
MCV RBC AUTO: 78.4 FL (ref 80–100)
MONOCYTES ABSOLUTE: 0.5 K/UL (ref 0–1.3)
MONOCYTES RELATIVE PERCENT: 7.5 %
NEUTROPHILS ABSOLUTE: 4.4 K/UL (ref 1.7–7.7)
NEUTROPHILS RELATIVE PERCENT: 69.8 %
PDW BLD-RTO: 16.2 % (ref 12.4–15.4)
PERFORMED ON: ABNORMAL
PLATELET # BLD: 136 K/UL (ref 135–450)
PMV BLD AUTO: 7.4 FL (ref 5–10.5)
POTASSIUM REFLEX MAGNESIUM: 3.8 MMOL/L (ref 3.5–5.1)
RBC # BLD: 5.04 M/UL (ref 4–5.2)
SODIUM BLD-SCNC: 138 MMOL/L (ref 136–145)
TOTAL IRON BINDING CAPACITY: 270 UG/DL (ref 260–445)
VITAMIN D 25-HYDROXY: 56 NG/ML
WBC # BLD: 6.3 K/UL (ref 4–11)

## 2022-06-19 PROCEDURE — 83540 ASSAY OF IRON: CPT

## 2022-06-19 PROCEDURE — 1200000000 HC SEMI PRIVATE

## 2022-06-19 PROCEDURE — 85025 COMPLETE CBC W/AUTO DIFF WBC: CPT

## 2022-06-19 PROCEDURE — 6360000004 HC RX CONTRAST MEDICATION: Performed by: INTERNAL MEDICINE

## 2022-06-19 PROCEDURE — 71260 CT THORAX DX C+: CPT

## 2022-06-19 PROCEDURE — 36415 COLL VENOUS BLD VENIPUNCTURE: CPT

## 2022-06-19 PROCEDURE — 6370000000 HC RX 637 (ALT 250 FOR IP): Performed by: INTERNAL MEDICINE

## 2022-06-19 PROCEDURE — 82607 VITAMIN B-12: CPT

## 2022-06-19 PROCEDURE — 6360000002 HC RX W HCPCS: Performed by: INTERNAL MEDICINE

## 2022-06-19 PROCEDURE — 2580000003 HC RX 258: Performed by: INTERNAL MEDICINE

## 2022-06-19 PROCEDURE — 82306 VITAMIN D 25 HYDROXY: CPT

## 2022-06-19 PROCEDURE — 80048 BASIC METABOLIC PNL TOTAL CA: CPT

## 2022-06-19 PROCEDURE — 83550 IRON BINDING TEST: CPT

## 2022-06-19 RX ORDER — NITROFURANTOIN MACROCRYSTALS 100 MG/1
100 CAPSULE ORAL EVERY 12 HOURS SCHEDULED
Status: DISCONTINUED | OUTPATIENT
Start: 2022-06-19 | End: 2022-06-19

## 2022-06-19 RX ORDER — ATORVASTATIN CALCIUM 20 MG/1
20 TABLET, FILM COATED ORAL DAILY
Status: DISCONTINUED | OUTPATIENT
Start: 2022-06-19 | End: 2022-06-25 | Stop reason: HOSPADM

## 2022-06-19 RX ORDER — TIMOLOL MALEATE 5 MG/ML
1 SOLUTION/ DROPS OPHTHALMIC DAILY
Status: DISCONTINUED | OUTPATIENT
Start: 2022-06-19 | End: 2022-06-19 | Stop reason: SDUPTHER

## 2022-06-19 RX ORDER — LISINOPRIL 10 MG/1
10 TABLET ORAL 2 TIMES DAILY
Status: DISCONTINUED | OUTPATIENT
Start: 2022-06-19 | End: 2022-06-23

## 2022-06-19 RX ORDER — DIPHENHYDRAMINE HYDROCHLORIDE 50 MG/ML
25 INJECTION INTRAMUSCULAR; INTRAVENOUS EVERY 6 HOURS PRN
Status: DISCONTINUED | OUTPATIENT
Start: 2022-06-19 | End: 2022-06-25 | Stop reason: HOSPADM

## 2022-06-19 RX ORDER — MECOBALAMIN 5000 MCG
5 TABLET,DISINTEGRATING ORAL NIGHTLY
Status: DISCONTINUED | OUTPATIENT
Start: 2022-06-19 | End: 2022-06-25 | Stop reason: HOSPADM

## 2022-06-19 RX ORDER — METHOCARBAMOL 750 MG/1
750 TABLET, FILM COATED ORAL 4 TIMES DAILY
Status: DISCONTINUED | OUTPATIENT
Start: 2022-06-19 | End: 2022-06-25 | Stop reason: HOSPADM

## 2022-06-19 RX ORDER — ASPIRIN 81 MG/1
81 TABLET, CHEWABLE ORAL DAILY
Status: DISCONTINUED | OUTPATIENT
Start: 2022-06-19 | End: 2022-06-25 | Stop reason: HOSPADM

## 2022-06-19 RX ORDER — LEVOTHYROXINE SODIUM 0.1 MG/1
100 TABLET ORAL DAILY
Status: DISCONTINUED | OUTPATIENT
Start: 2022-06-19 | End: 2022-06-25 | Stop reason: HOSPADM

## 2022-06-19 RX ORDER — HYDROCHLOROTHIAZIDE 25 MG/1
25 TABLET ORAL DAILY
Status: DISCONTINUED | OUTPATIENT
Start: 2022-06-19 | End: 2022-06-23

## 2022-06-19 RX ORDER — LATANOPROST 50 UG/ML
1 SOLUTION/ DROPS OPHTHALMIC NIGHTLY
Status: DISCONTINUED | OUTPATIENT
Start: 2022-06-19 | End: 2022-06-19 | Stop reason: SDUPTHER

## 2022-06-19 RX ORDER — GEMFIBROZIL 600 MG/1
600 TABLET, FILM COATED ORAL 2 TIMES DAILY
Status: DISCONTINUED | OUTPATIENT
Start: 2022-06-19 | End: 2022-06-20

## 2022-06-19 RX ADMIN — SODIUM CHLORIDE, PRESERVATIVE FREE 10 ML: 5 INJECTION INTRAVENOUS at 08:46

## 2022-06-19 RX ADMIN — SODIUM CHLORIDE 25 ML: 9 INJECTION, SOLUTION INTRAVENOUS at 16:17

## 2022-06-19 RX ADMIN — Medication 5 MG: at 20:34

## 2022-06-19 RX ADMIN — LISINOPRIL 10 MG: 10 TABLET ORAL at 09:02

## 2022-06-19 RX ADMIN — LATANOPROST 1 DROP: 50 SOLUTION OPHTHALMIC at 20:38

## 2022-06-19 RX ADMIN — ATORVASTATIN CALCIUM 20 MG: 20 TABLET, FILM COATED ORAL at 08:44

## 2022-06-19 RX ADMIN — INSULIN LISPRO 1 UNITS: 100 INJECTION, SOLUTION INTRAVENOUS; SUBCUTANEOUS at 20:40

## 2022-06-19 RX ADMIN — HYDROCHLOROTHIAZIDE 25 MG: 25 TABLET ORAL at 08:44

## 2022-06-19 RX ADMIN — LEVOTHYROXINE SODIUM 100 MCG: 0.1 TABLET ORAL at 05:49

## 2022-06-19 RX ADMIN — POLYETHYLENE GLYCOL 3350 17 G: 17 POWDER, FOR SOLUTION ORAL at 20:34

## 2022-06-19 RX ADMIN — ACETAMINOPHEN 650 MG: 325 TABLET ORAL at 08:44

## 2022-06-19 RX ADMIN — ENOXAPARIN SODIUM 40 MG: 100 INJECTION SUBCUTANEOUS at 08:45

## 2022-06-19 RX ADMIN — ASPIRIN 81 MG: 81 TABLET, CHEWABLE ORAL at 08:44

## 2022-06-19 RX ADMIN — METHOCARBAMOL 750 MG: 750 TABLET ORAL at 16:24

## 2022-06-19 RX ADMIN — TIMOLOL MALEATE 1 DROP: 5 SOLUTION OPHTHALMIC at 08:46

## 2022-06-19 RX ADMIN — GEMFIBROZIL 600 MG: 600 TABLET ORAL at 20:38

## 2022-06-19 RX ADMIN — IOPAMIDOL 75 ML: 755 INJECTION, SOLUTION INTRAVENOUS at 13:52

## 2022-06-19 RX ADMIN — CEFTRIAXONE 1000 MG: 1 INJECTION, POWDER, FOR SOLUTION INTRAMUSCULAR; INTRAVENOUS at 16:19

## 2022-06-19 RX ADMIN — INSULIN GLARGINE 10 UNITS: 100 INJECTION, SOLUTION SUBCUTANEOUS at 20:40

## 2022-06-19 RX ADMIN — Medication 5 MG: at 02:11

## 2022-06-19 RX ADMIN — METHOCARBAMOL 750 MG: 750 TABLET ORAL at 20:34

## 2022-06-19 RX ADMIN — SODIUM CHLORIDE, PRESERVATIVE FREE 10 ML: 5 INJECTION INTRAVENOUS at 20:39

## 2022-06-19 RX ADMIN — LISINOPRIL 10 MG: 10 TABLET ORAL at 20:35

## 2022-06-19 RX ADMIN — INSULIN LISPRO 1 UNITS: 100 INJECTION, SOLUTION INTRAVENOUS; SUBCUTANEOUS at 09:47

## 2022-06-19 RX ADMIN — DOXYCYCLINE 100 MG: 100 CAPSULE ORAL at 08:44

## 2022-06-19 ASSESSMENT — PAIN SCALES - GENERAL
PAINLEVEL_OUTOF10: 0
PAINLEVEL_OUTOF10: 3
PAINLEVEL_OUTOF10: 0

## 2022-06-19 ASSESSMENT — PAIN DESCRIPTION - FREQUENCY: FREQUENCY: CONTINUOUS

## 2022-06-19 ASSESSMENT — PAIN DESCRIPTION - PAIN TYPE: TYPE: ACUTE PAIN

## 2022-06-19 ASSESSMENT — PAIN DESCRIPTION - LOCATION: LOCATION: HEAD

## 2022-06-19 ASSESSMENT — PAIN DESCRIPTION - DESCRIPTORS: DESCRIPTORS: ACHING

## 2022-06-19 ASSESSMENT — PAIN DESCRIPTION - ONSET: ONSET: ON-GOING

## 2022-06-19 ASSESSMENT — PAIN - FUNCTIONAL ASSESSMENT: PAIN_FUNCTIONAL_ASSESSMENT: ACTIVITIES ARE NOT PREVENTED

## 2022-06-19 ASSESSMENT — PAIN DESCRIPTION - ORIENTATION: ORIENTATION: UPPER;POSTERIOR

## 2022-06-19 NOTE — PROGRESS NOTES
HOSPITAL MEDICINE  - PROGRESS NOTE    Admit Date: 6/18/2022         Interval History:   64 y.o. female,hx of prior CVA following LEONILA in 2019 with residual rt sided hemiparesis,breast Ca s/p treatment,DM2,HTN,HLD ,chr back pain who presents  after a fall     fell at her  [facility]-3rd fall in a few days. Says lo has been shaky and is falling apart and has plans to see a Neurologist soon as well as PT/OT. Was about to transfer into her wheelchair when she felt shaky and fell hitting her head,left arm  In the ED,she was  86% on room air and was placed on 3 L   No dysuria but has had frequency   Her urine was nitrite positive with no pyuria -she reports freq and chr UTI   - CXR showed possible infiltrate vs atelectasis. She was started on empiric antibx for Pneumonia     Subjective: Feels falls sec to legs jumping around/elmo horses-both LE  Reports marked weakness  Reports left shoulder \"out\" and is clicking-since people picked her up post fall    Objective: afebrile    Diet: ADULT DIET; Regular; 5 carb choices (75 gm/meal)       Data:   Scheduled Meds: Reviewed  Continuous Infusions:   sodium chloride      dextrose         Intake/Output Summary (Last 24 hours) at 6/19/2022 1213  Last data filed at 6/19/2022 1130  Gross per 24 hour   Intake 600 ml   Output 425 ml   Net 175 ml     CBC:   Recent Labs     06/19/22  1001   WBC 6.3   HGB 12.8        BMP:  Recent Labs     06/19/22  1001      K 3.8   CL 98*   CO2 31   BUN 13   CREATININE 0.6   GLUCOSE 188*          Objective:   Vitals: BP (!) 151/78   Pulse 80   Temp 98.4 °F (36.9 °C) (Oral)   Resp 18   Ht 5' 4\" (1.626 m)   Wt 171 lb 8.3 oz (77.8 kg)   LMP  (LMP Unknown)   SpO2 92%   BMI 29.44 kg/m²   General appearance: alert, appears stated age and cooperative  Skin: Skin color, texture, turgor normal.   HEENT: Head: Normocephalic, no lesions, without obvious abnormality.   Neck:

## 2022-06-19 NOTE — PLAN OF CARE
Problem: Discharge Planning  Goal: Discharge to home or other facility with appropriate resources  6/19/2022 0221 by Osman Cline RN  Outcome: Progressing     Problem: Pain  Goal: Verbalizes/displays adequate comfort level or baseline comfort level  6/19/2022 0221 by Osman Cline RN  Outcome: Progressing  No pain noted at this time. Problem: Safety - Adult  Goal: Free from fall injury  6/19/2022 0221 by Osman Cline RN  Outcome: Progressing  Flowsheets (Taken 6/18/2022 1931)  Free From Fall Injury: Instruct family/caregiver on patient safety  Bed alarm in place, call light in reach, bed locked and in lowest position, nonslip socks placed on patient      Problem: Skin/Tissue Integrity  Goal: Absence of new skin breakdown  Description: 1. Monitor for areas of redness and/or skin breakdown  2. Assess vascular access sites hourly  3. Every 4-6 hours minimum:  Change oxygen saturation probe site  4. Every 4-6 hours:  If on nasal continuous positive airway pressure, respiratory therapy assess nares and determine need for appliance change or resting period. 6/19/2022 0221 by Osman Cline RN  Outcome: Progressing  No skin issues noted, patient able to turn self at this time. Problem: Respiratory - Adult  Goal: Achieves optimal ventilation and oxygenation  Outcome: Progressing  Pt on 3L on saturating above 91%.  RR wnl

## 2022-06-19 NOTE — PLAN OF CARE
Problem: Discharge Planning  Goal: Discharge to home or other facility with appropriate resources  6/19/2022 1145 by Panchito Chinchilla RN  Outcome: Progressing  Flowsheets (Taken 6/19/2022 1145)  Discharge to home or other facility with appropriate resources:   Identify barriers to discharge with patient and caregiver   Identify discharge learning needs (meds, wound care, etc)   Refer to discharge planning if patient needs post-hospital services based on physician order or complex needs related to functional status, cognitive ability or social support system   Arrange for needed discharge resources and transportation as appropriate     Problem: Pain  Goal: Verbalizes/displays adequate comfort level or baseline comfort level  6/19/2022 1145 by Panchito Chinchilla RN  Outcome: Progressing  Flowsheets (Taken 6/19/2022 1145)  Verbalizes/displays adequate comfort level or baseline comfort level:   Assess pain using appropriate pain scale   Implement non-pharmacological measures as appropriate and evaluate response   Notify Licensed Independent Practitioner if interventions unsuccessful or patient reports new pain   Consider cultural and social influences on pain and pain management   Administer analgesics based on type and severity of pain and evaluate response   Encourage patient to monitor pain and request assistance     Problem: Safety - Adult  Goal: Free from fall injury  6/19/2022 1145 by Panchito Chinchilla RN  Outcome: Progressing  Flowsheets  Taken 6/19/2022 1145  Free From Fall Injury:   Instruct family/caregiver on patient safety   Based on caregiver fall risk screen, instruct family/caregiver to ask for assistance with transferring infant if caregiver noted to have fall risk factors  Taken 6/19/2022 0723  Free From Fall Injury:   Instruct family/caregiver on patient safety   Based on caregiver fall risk screen, instruct family/caregiver to ask for assistance with transferring infant if caregiver noted to have fall risk factors     Problem: Skin/Tissue Integrity  Goal: Absence of new skin breakdown  Description: 1. Monitor for areas of redness and/or skin breakdown  2. Assess vascular access sites hourly  3. Every 4-6 hours minimum:  Change oxygen saturation probe site  4. Every 4-6 hours:  If on nasal continuous positive airway pressure, respiratory therapy assess nares and determine need for appliance change or resting period.   6/19/2022 1145 by Richmond Sanderson RN  Outcome: Progressing     Problem: Respiratory - Adult  Goal: Achieves optimal ventilation and oxygenation  6/19/2022 1145 by Richmond Sanderson RN  Outcome: Progressing

## 2022-06-20 ENCOUNTER — APPOINTMENT (OUTPATIENT)
Dept: VASCULAR LAB | Age: 77
DRG: 189 | End: 2022-06-20
Payer: MEDICARE

## 2022-06-20 LAB
CHOLESTEROL, TOTAL: 100 MG/DL (ref 0–199)
GLUCOSE BLD-MCNC: 123 MG/DL (ref 70–99)
GLUCOSE BLD-MCNC: 133 MG/DL (ref 70–99)
GLUCOSE BLD-MCNC: 181 MG/DL (ref 70–99)
GLUCOSE BLD-MCNC: 219 MG/DL (ref 70–99)
HDLC SERPL-MCNC: 39 MG/DL (ref 40–60)
LDL CHOLESTEROL CALCULATED: 37 MG/DL
ORGANISM: ABNORMAL
PERFORMED ON: ABNORMAL
TRIGL SERPL-MCNC: 122 MG/DL (ref 0–150)
URINE CULTURE, ROUTINE: ABNORMAL
VITAMIN B-12: 302 PG/ML (ref 211–911)
VLDLC SERPL CALC-MCNC: 24 MG/DL

## 2022-06-20 PROCEDURE — 1200000000 HC SEMI PRIVATE

## 2022-06-20 PROCEDURE — 97530 THERAPEUTIC ACTIVITIES: CPT

## 2022-06-20 PROCEDURE — 2580000003 HC RX 258: Performed by: INTERNAL MEDICINE

## 2022-06-20 PROCEDURE — 6360000002 HC RX W HCPCS: Performed by: INTERNAL MEDICINE

## 2022-06-20 PROCEDURE — 6370000000 HC RX 637 (ALT 250 FOR IP): Performed by: INTERNAL MEDICINE

## 2022-06-20 PROCEDURE — 93970 EXTREMITY STUDY: CPT

## 2022-06-20 PROCEDURE — 80061 LIPID PANEL: CPT

## 2022-06-20 PROCEDURE — 97166 OT EVAL MOD COMPLEX 45 MIN: CPT

## 2022-06-20 PROCEDURE — 36415 COLL VENOUS BLD VENIPUNCTURE: CPT

## 2022-06-20 PROCEDURE — 97163 PT EVAL HIGH COMPLEX 45 MIN: CPT

## 2022-06-20 RX ORDER — FUROSEMIDE 10 MG/ML
40 INJECTION INTRAMUSCULAR; INTRAVENOUS ONCE
Status: COMPLETED | OUTPATIENT
Start: 2022-06-20 | End: 2022-06-20

## 2022-06-20 RX ORDER — LEVOTHYROXINE SODIUM 112 UG/1
224 TABLET ORAL WEEKLY
COMMUNITY

## 2022-06-20 RX ADMIN — METHOCARBAMOL 750 MG: 750 TABLET ORAL at 18:11

## 2022-06-20 RX ADMIN — FUROSEMIDE 40 MG: 10 INJECTION, SOLUTION INTRAMUSCULAR; INTRAVENOUS at 18:11

## 2022-06-20 RX ADMIN — POLYETHYLENE GLYCOL 3350 17 G: 17 POWDER, FOR SOLUTION ORAL at 20:55

## 2022-06-20 RX ADMIN — ENOXAPARIN SODIUM 40 MG: 100 INJECTION SUBCUTANEOUS at 09:13

## 2022-06-20 RX ADMIN — LATANOPROST 1 DROP: 50 SOLUTION OPHTHALMIC at 20:56

## 2022-06-20 RX ADMIN — HYDROCHLOROTHIAZIDE 25 MG: 25 TABLET ORAL at 09:13

## 2022-06-20 RX ADMIN — METHOCARBAMOL 750 MG: 750 TABLET ORAL at 09:13

## 2022-06-20 RX ADMIN — SODIUM CHLORIDE, PRESERVATIVE FREE 10 ML: 5 INJECTION INTRAVENOUS at 21:14

## 2022-06-20 RX ADMIN — TIMOLOL MALEATE 1 DROP: 5 SOLUTION OPHTHALMIC at 09:16

## 2022-06-20 RX ADMIN — LISINOPRIL 10 MG: 10 TABLET ORAL at 09:13

## 2022-06-20 RX ADMIN — INSULIN LISPRO 1 UNITS: 100 INJECTION, SOLUTION INTRAVENOUS; SUBCUTANEOUS at 21:02

## 2022-06-20 RX ADMIN — METHOCARBAMOL 750 MG: 750 TABLET ORAL at 13:36

## 2022-06-20 RX ADMIN — SODIUM CHLORIDE 25 ML: 9 INJECTION, SOLUTION INTRAVENOUS at 18:15

## 2022-06-20 RX ADMIN — ASPIRIN 81 MG: 81 TABLET, CHEWABLE ORAL at 09:13

## 2022-06-20 RX ADMIN — CEFTRIAXONE 1000 MG: 1 INJECTION, POWDER, FOR SOLUTION INTRAMUSCULAR; INTRAVENOUS at 18:16

## 2022-06-20 RX ADMIN — LEVOTHYROXINE SODIUM 100 MCG: 0.1 TABLET ORAL at 05:20

## 2022-06-20 RX ADMIN — METHOCARBAMOL 750 MG: 750 TABLET ORAL at 20:55

## 2022-06-20 RX ADMIN — Medication 5 MG: at 20:55

## 2022-06-20 RX ADMIN — INSULIN GLARGINE 10 UNITS: 100 INJECTION, SOLUTION SUBCUTANEOUS at 21:01

## 2022-06-20 RX ADMIN — LISINOPRIL 10 MG: 10 TABLET ORAL at 20:55

## 2022-06-20 RX ADMIN — INSULIN LISPRO 2 UNITS: 100 INJECTION, SOLUTION INTRAVENOUS; SUBCUTANEOUS at 13:28

## 2022-06-20 RX ADMIN — ATORVASTATIN CALCIUM 20 MG: 20 TABLET, FILM COATED ORAL at 09:13

## 2022-06-20 RX ADMIN — SODIUM CHLORIDE, PRESERVATIVE FREE 10 ML: 5 INJECTION INTRAVENOUS at 09:15

## 2022-06-20 ASSESSMENT — PAIN SCALES - GENERAL
PAINLEVEL_OUTOF10: 0

## 2022-06-20 NOTE — PROGRESS NOTES
Clinical Pharmacy Progress Note  Medication History     Admit Date: 6/18/2022    List of of current medications patient is taking is complete. Home Medication list in EPIC updated to reflect changes noted below. Source of information: med list from facility; list dated 6/9/22     Changes made to medication list:   Medications removed: (include reason, ex: therapy completed, patient no longer taking, etc.)   Gemfibrozil    meloxicam - no on med list from facility   Oxycodone-APAP - not on med list from facility   Trazodone - not on  Med list from facility  Medication doses adjusted:    Calcium + vitamin D   Levothyroxine - pt takes 112 mcg 6x weekly and 224 mcg once weekly    Lisinopril 10 mg BID   MVI   Metformin 1000mg BID      Current Outpatient Medications   Medication Instructions    aspirin 81 mg, Oral, DAILY    atorvastatin (LIPITOR) 20 mg, Oral, DAILY    Calcium Carbonate-Vitamin D (CALTRATE 600+D) 600-400 MG-UNIT TABS 1 tablet, Oral, DAILY    hydrochlorothiazide (HYDRODIURIL) 25 MG tablet TAKE ONE TABLET BY MOUTH EVERY DAY    latanoprost (XALATAN) 0.005 % ophthalmic solution 1 drop, Both Eyes, NIGHTLY    levothyroxine (SYNTHROID) 112 mcg, Oral, SIX TIMES WEEKLY    levothyroxine (SYNTHROID) 224 mcg, Oral, WEEKLY    lisinopril (PRINIVIL;ZESTRIL) 10 MG tablet TAKE TWO TABLETS BY MOUTH EVERY DAY    metFORMIN (GLUCOPHAGE) 500 mg, Oral, DAILY WITH BREAKFAST    Multiple Vitamins-Minerals (CERTA-SIMONE PO) 1 tablet, Oral, DAILY    timolol (TIMOPTIC) 0.5 % ophthalmic solution 1 drop, DAILY       Please call with any questions.   Jem Tovar PharmD, BCPS  Wireless: [phone]   6/20/2022 12:12 PM

## 2022-06-20 NOTE — PROGRESS NOTES
Physical Therapy  Facility/Department: [place] [address] [facility]  Physical Therapy Initial Assessment    Name: Mariano Arceo  : 1945  MRN: [id number]  Date of Service: 2022    Discharge Recommendations:  Maraino Arceo scored a  on the AM-PAC short mobility form. Current research shows that an AM-PAC score of 17 or less is typically not associated with a discharge to the patient's home setting. Based on the patient's AM-PAC score and their current functional mobility deficits, it is recommended that the patient have 3-5 sessions per week of Physical Therapy at d/c to increase the patient's independence. Please see assessment section for further patient specific details. If patient discharges prior to next session this note will serve as a discharge summary. Please see below for the latest assessment towards goals. Subacute/Skilled Nursing Facility,24 hour supervision or assist   PT Equipment Recommendations  Equipment Needed: No (defer to next level of care)      Patient Diagnosis(es): The primary encounter diagnosis was Community acquired pneumonia, unspecified laterality. A diagnosis of Hypoxemia was also pertinent to this visit. Past Medical History:  has a past medical history of Cancer [facility]), Cerebral artery occlusion with cerebral infarction ([facility] [address] Glaucoma, Hyperlipidemia, Hypertension, [place] (nonalcoholic steatohepatitis), PPD positive, Thyroid disease, and Type II or unspecified type diabetes mellitus without mention of complication, not stated as uncontrolled. Past Surgical History:  has a past surgical history that includes Tonsillectomy (5 years ago); Appendectomy (14 years ago); Dilation and curettage of uterus (); Breast surgery; Breast biopsy ();  Colonoscopy (2011); joint replacement (Right); other surgical history (16); and Mastectomy, bilateral.    Assessment   Body Structures, Functions, Activity Limitations Requiring Skilled Therapeutic Intervention: Decreased functional mobility ; Decreased strength;Decreased safe awareness;Decreased endurance;Decreased balance  Assessment: Pt is a 65 y/o female who presents below functional baseline. Per pt she lives in an [address] apartment and independent with all functional transfers and mobility with use of W/C. At this time pt requires Min A x 2 for supine to sit and Mod A x 2 for sit to supine. Pt refused to attempt stand pivot transfer this date as she is waiting to go down for multiple tests. Pt would benefit from skilled PT Services to promote increased strength, balance and functional activity tolerance to allow for return to PLOF. Upon discharge pt would benefit from SNF vs AL with increased assistance. Treatment Diagnosis: generalized weakness  Therapy Prognosis: Good  Decision Making: Medium Complexity  Barriers to Learning: none  Requires PT Follow-Up: Yes  Activity Tolerance  Activity Tolerance: Patient limited by fatigue;Patient limited by pain; Patient limited by endurance;Treatment limited secondary to decreased cognition     Plan   Plan  Plan:  (2-5 x/wk)  Current Treatment Recommendations: Strengthening,Balance training,Functional mobility training,Transfer training,Endurance training,Therapeutic activities,Safety education & training  Safety Devices  Type of Devices: All fall risk precautions in place,Bed alarm in place,Call light within reach,Left in bed,Nurse notified     Restrictions  Position Activity Restriction  Other position/activity restrictions: up with assist     Subjective   General  Chart Reviewed: Yes  Patient assessed for rehabilitation services?: Yes  Additional Pertinent Hx: The patient is a 64 y.o. female,hx of prior CVA following LEONILA in 2019 with residual rt sided hemiparesis,breast Ca s/p treatment,DM2,HTN,HLD ,chr back pain who presents  after a fall fell at her  [facility] point-3rd fall in a few days. Says lo has been shaky and is falling apart and has plans to see a Neurologist soon. Was about to transfer into her wheelchair when she felt shaky and fell hitting her head,left [place] the ED,she was  86% on room air and was placed on 3 L No dysuria but has had frequency-tells me nothing can cure her UTI which is chronicNo cough. No fever or chills. No PND or orthopnea Her urine was nitrite positive and CXR showed possible infiltrate vs atelectasis. She was started on empiric antibx for Pneumonia Has been constipated for 3 days and has had decreased appetite  Diagnosis: Acute Resp Failure  Subjective  Subjective: Pt supine in bed with sister in law present. Pt agreeable to PT/OT eval.         Social/Functional History  Social/Functional History  Lives With: Alone  Type of Home: Assisted living  Home Layout: One level  Home Access: Level entry  Bathroom Shower/Tub: Walk-in shower  Bathroom Toilet: Handicap height  Bathroom Equipment: Grab bars in shower,Tub transfer bench,Grab bars around toilet  Home Equipment: Wheelchair-manual,Grab bars,Walker, 4 wheeled (flat bed with side rails)  Has the patient had two or more falls in the past year or any fall with injury in the past year?: Yes (falls getting in/out of bed > WC, 3 times in 6 months)  Receives Help From: Home health  ADL Assistance: Needs assistance (needs assist for showers, dresses and toilets independently)  Toileting: Independent (effortful, with difficulty)  Homemaking Assistance: Needs assistance (AL provides meals, aids assist with cleaning and laundry)  Ambulation Assistance: Independent  Transfer Assistance: Independent  Active : No  Additional Comments: pt claims she is having increased difficulty  Vision/Hearing  Vision  Vision: Impaired  Vision Exceptions: Wears glasses at all times  Hearing  Hearing: Within functional limits    Cognition   Orientation  Overall Orientation Status: Within Functional Limits  Cognition  Overall Cognitive Status: Exceptions  Arousal/Alertness: Appropriate responses to stimuli  Following Commands:  Follows one step commands with repetition  Attention Span: Difficulty dividing attention; Attends with cues to redirect  Memory: Appears intact  Safety Judgement: Decreased awareness of need for assistance;Decreased awareness of need for safety  Problem Solving: Assistance required to identify errors made;Assistance required to generate solutions  Insights: Decreased awareness of deficits  Initiation: Requires cues for some  Sequencing: Requires cues for some     Objective      Observation/Palpation  Posture: Fair  Gross Assessment  AROM: Generally decreased, functional  Strength: Grossly decreased, non-functional (significant R LE weakness from previous stroke, increased LE pain therefore MMT not completed)  Sensation: Impaired     Bed Mobility Training  Bed Mobility Training: Yes  Overall Level of Assistance: Moderate assistance;Assist X2 (Max VC and TC given for proper sequencing of supine <> sit.)  Supine to Sit: Minimum assistance;Assist X2  Sit to Supine:  Moderate assistance;Assist X2  Balance  Sitting: Impaired  Sitting - Static: Occasional (Max VC for upright posture, sequencing and attention to task)       -PAC Score  -PAC Inpatient Mobility Raw Score : 11 (06/20/22 1519)  -PAC Inpatient T-Scale Score : 33.86 (06/20/22 1519)  Mobility Inpatient CMS 0-100% Score: 72.57 (06/20/22 1519)  Mobility Inpatient CMS G-Code Modifier : CL (06/20/22 1519)          Goals  Short Term Goals  Time Frame for Short term goals: upon discharge  Short term goal 1: Pt will complete bed mobility with CGA x 1  Short term goal 2: Pt will complete stand pivot transfer with CGA x 1  Short term goal 3: Pt will demonstrate fair + dynamic sitting  Patient Goals   Patient goals : to return home       Education         Therapy Time   Individual Concurrent Group Co-treatment   Time In 1425         Time Out 1505         Minutes 40         Timed Code Treatment Minutes: [id number] [place], [place], DPT PT [id number]  Airam Johnson, PT

## 2022-06-20 NOTE — CARE COORDINATION
Case Management Assessment           Initial Evaluation                Date / Time of Evaluation: 6/20/2022 3:36 PM                 Assessment Completed by: Shahnaz Goode RN    Patient Name: Pratik Alcala     YOB: 1945  Diagnosis: Hypoxemia [R09.02]  Acute respiratory failure with hypoxia ([address] [J96.01]  Community acquired pneumonia, unspecified laterality [J18.9]     Date / Time: 6/18/2022  4:03 AM    Patient Admission Status: Inpatient    If patient is discharged prior to next notation, then this note serves as note for discharge by case management.      Current PCP: Kaitlin Calvin MD  Clinic Patient: No    Chart Reviewed: Yes  Patient/ Family Interviewed: Yes    Initial assessment completed at bedside with: patient and Son Elise Lucas    Hospitalization in the last 30 days: No    Emergency Contacts:  Extended Emergency Contact Information  Primary Emergency Contact: [address] Matthews of [address] [place] [address] Phone: [phone]  Relation: Child    Advance Directives:   Code Status: [address]: Yes  Agent:   Contact Number:     Copy present: No     In paper Chart: No    Scanned into EMR No    Financial  Payor: Gordon Saleh / Plan: [facility] HMO / Product Type: *No Product type* /     Pre-cert required for SNF: Yes    Pharmacy    [address] [facility], [address] [place] [phone] - F [phone]  [address] [facility]  [address]  Phone: [phone] Fax: [phone]    [address] Mail Delivery (Now [address] [facility] [facility]) - Amanda Garcia [facility] [address] [facility] [address]  Phone: [phone] Fax: [phone]      Potential assistance Purchasing Medications: Potential Assistance Purchasing Medications: No  Does Patient want to participate in local refill/ meds to beds program?:      Meds To [address] [place] [address] Rules: 1. Can ONLY be done Monday- Friday between 8:30am-5pm  2. Prescription(s) must be in pharmacy by 3pm to be filled same day  3. Copy of patient's insurance/ prescription drug card and patient face sheet must be sent along with the prescription(s)  4. Cost of Rx cannot be added to hospital bill. If financial assistance is needed, please contact unit  or ;  or  CANNOT provide pharmacy voucher for patients co-pays  5. Patients can then  the prescription on their way out of the hospital at discharge, or pharmacy can deliver to the bedside if staff is available. (payment due at time of pick-up or delivery - cash, check, or card accepted)     Able to afford home medications/ co-pay costs: Yes    ADLS  Support Systems: Daniella Price Members    PT AM-PAC: 11 /24  OT AM-PAC: 16 /24    [place]: from [address] [facility]  Steps: 0    Plans to RETURN to current housing: Yes  Barriers to RETURNING to current housing: may need SNF prior to return [address]  Currently ACTIVE with [address]: No  Home Care Agency: Not Applicable    Currently ACTIVE with [place] on Aging: No  Passport/ Waiver: No  Passport/ Waiver Services: Not Applicable    :  Phone:       [address]  [facility] Provider: has prior to admission  Equipment: wheelchair    Home Oxygen and [address] prior to admission: No  [address]: Not Applicable  Other Respiratory Equipment:     Informed of need to bring portable home O2 tank on day of DISCHARGE for nursing to connect prior to leaving: No  Verbalized agreement/Understanding: No  Person to bring portable tank at discharge:     Dialysis  Active with HD/PD prior to admission: No  Nephrologist:     HD Center:  Not Applicable    DISCHARGE PLAN:  Disposition: [address]: [facility] Phone: . Fax: Nikolai Alcala     Transportation PLAN for discharge: EMS transportation     Factors facilitating achievement of predicted outcomes: Family support, Caregiver support, Cooperative and Pleasant    Barriers to discharge: Medication managment and weaning O2    Additional Case Management Notes: CM spoke with patient and son Hernandez Martinez, patient lives at [address] at [facility] prior to admission, patient is mostly wheelchair bound, per Gilford Cassette (DON- Cell: [phone] and Fax: [phone]) at [facility], patient was able to ambulate maybe 3 feet while pushing her wheelchair. Patient has had decline over the past month, she is newly diagnosed diabetic, was started on [place] for diabetes as her A1C was over 10 and per DON has come down to about 7. Gilford Cassette reports that patient was not provided any new monitors or other meds for her new DM diagnosis and reports that the nursing staff had started checking patients blood sugars and helping her to get them back under control, initially blood sugars were over 300 and have come down to the 170's lately. Patient has had increased general weakness over the last 3-4 weeks and X2IMPACTford Cassette reports, patient has not been having falls but has been self lowering herself to the ground when her legs give out from under her while transferring from wheelchair to bed in the evening times.  Patient is suppose to see a neurologist early July, as it was noticed that she started with some left sided facial droop, slurred speech and increased LE weakness, PCP at [facility] did not order additional testing at that time per Gilford Cassette, as she reports it would not change plan of care for patient, but recommended that patient see a neurologist.    Gilford Cassette reports that they have a non-skilled rehab unit at [facility] that patient was getting ready to move down to for rehab for strengthening and then would return to her AL Apartment in the building but was then admitted to the hospital. CM noted orders for PT/OT and will follow up with Gilford Cassette to make sure it is safe to patient to return and continue with therapy at [facility] or if she would need to go to SNF prior to return. CM discussed this with son at length and is aware of communications between [facility] and DON at [facility]. Patient currently on 5L O2 and does not use any at baseline. Patient will need medical transport at DC, will need [place] for [employer] if SNF needed at DC, if unable to return to the non-skilled unit at [facility]. The Plan for Transition of Care is related to the following treatment goals of Hypoxemia [R09.02]  Acute respiratory failure with hypoxia ([address] [J96.01]  Community acquired pneumonia, unspecified laterality [J18.9]    The Patient and/or patient representative Marcelo spencer and her family were provided with a choice of provider and agrees with the discharge plan Yes    Freedom of choice list was provided with basic dialogue that supports the patient's individualized plan of care/goals and shares the quality data associated with the providers.  Yes    Care Transition patient: No    Reza Jeffries RN  [facility]  Case Management Department  Ph: [phone]   Fax: [phone]

## 2022-06-20 NOTE — PROGRESS NOTES
Occupational Therapy  Facility/Department: [place] [address] [facility]  Occupational Therapy Initial Assessment/Treatment    Name: Gilman Essex  : 1945  MRN: [id number]  Date of Service: 2022    Discharge Recommendations:    Gilman Essex scored a 16/24 on the AM-PAC ADL Inpatient form. Current research shows that an AM-PAC score of 17 or less is typically not associated with a discharge to the patient's home setting. Based on the patient's AM-PAC score and their current ADL deficits, it is recommended that the patient have 3-5 sessions per week of Occupational Therapy at d/c to increase the patient's independence. Please see assessment section for further patient specific details. If patient discharges prior to next session this note will serve as a discharge summary. Please see below for the latest assessment towards goals. Patient Diagnosis(es): The primary encounter diagnosis was Community acquired pneumonia, unspecified laterality. A diagnosis of Hypoxemia was also pertinent to this visit. Past Medical History:  [facility] Cerebral artery occlusion with cerebral infarction ([facility] [address] Glaucoma, Hyperlipidemia, Hypertension, [place] (nonalcoholic steatohepatitis), PPD positive, Thyroid disease, and Type II or unspecified type diabetes mellitus without mention of complication, not stated as uncontrolled. Past Surgical History:  has a past surgical history that includes Tonsillectomy (5 years ago); Appendectomy (14 years ago); Dilation and curettage of uterus (); Breast surgery; Breast biopsy (); Colonoscopy (2011); joint replacement (Right); other surgical history (16); and Mastectomy, bilateral.    Treatment Diagnosis: functional mobility/ADL deficit      Assessment   Performance deficits / Impairments: Decreased functional mobility ; Decreased strength;Decreased ADL status; Decreased balance;Decreased safe awareness;Decreased endurance  Assessment: pt is a 76 y.o. woman who presents following fall at [address] [facility]. Pt limited By RLE from old stroke and has had increased difficulty with transfers and ADL performance resulting in multiple falls. Pt required min assistx2 for supine to sit and mod assist with sit to supine. Pt tolerated sitting EOB for ~10 min with CGA. Anticipate pt to require assist for all ADLs based on current strength and balance. Pt planning to DC to AL with increased assist. Pt would benefit from continued OT services at DC for increased functional mobility and ADL performance and safety. Treatment Diagnosis: functional mobility/ADL deficit  Prognosis: Good  Decision Making: Medium Complexity  REQUIRES OT FOLLOW-UP: Yes  Activity Tolerance  Activity Tolerance: Patient Tolerated treatment well        Plan   Plan  Times per Week: 2-5  Times per Day: Daily  Current Treatment Recommendations: Strengthening,Balance training,Functional mobility training,Endurance training,Patient/Caregiver education & training,Safety education & training,Equipment evaluation, education, & procurement,Self-Care / ADL     Restrictions  Position Activity Restriction  Other position/activity restrictions: up with assist    Subjective   General  Chart Reviewed: Yes  Patient assessed for rehabilitation services?: Yes  Additional Pertinent Hx: 64 y.o. female,hx of prior CVA following LEONILA in 2019 with residual rt sided hemiparesis,breast Ca s/p treatment,DM2,HTN,HLD ,chr back pain who presents [facility] in a few days. Was about to transfer into her wheelchair when she felt shaky and fell hitting her head,left arm  Family / Caregiver Present: Yes (son's wife)  Referring Practitioner: Hannah Dukes MD  Diagnosis: Acute respiratory failure with hypoxia  Subjective  Subjective: pt in bed upon arrival, agreeable to PT/OT evaluation.      Social/Functional History  Social/Functional History  Lives With: Alone  Type of Home: Assisted living  Home Layout: One level  Home Access: Level entry  Bathroom Shower/Tub: Walk-in shower  Bathroom Toilet: Handicap height  Bathroom Equipment: Grab bars in shower,Tub transfer bench,Grab bars around toilet  Home Equipment: Wheelchair-manual,Grab bars,Walker, 4 wheeled (flat bed with side rails)  Has the patient had two or more falls in the past year or any fall with injury in the past year?: Yes (falls getting in/out of bed > WC, 3 times in 6 months)  Receives Help From: Home health  ADL Assistance: Needs assistance (needs assist for showers, dresses and toilets independently)  Toileting: Independent (effortful, with difficulty)  Homemaking Assistance: Needs assistance (AL provides meals, aids assist with cleaning and laundry)  Ambulation Assistance: Independent  Transfer Assistance: Independent  Active : No  Additional Comments: pt claims she is having increased difficulty       Objective   Heart Rate: 80  Heart Rate Source: Monitor  BP: 128/65  BP Location: Left upper arm  BP Method: Automatic  Patient Position: Semi fowlers  MAP (Calculated): 86  Resp: 17  SpO2: 93 %  O2 Device: High flow nasal cannula          Observation/Palpation  Posture: Fair  Safety Devices  Type of Devices: All fall risk precautions in place; Bed alarm in place;Call light within reach; Left in bed;Nurse notified  Bed Mobility Training  Bed Mobility Training: Yes  Overall Level of Assistance: Moderate assistance;Assist X2 (Max VC and TC given for proper sequencing of supine <> sit.)  Supine to Sit: Minimum assistance;Assist X2  Sit to Supine:  Moderate assistance;Assist X2  Balance  Sitting: Impaired  Sitting - Static: Occasional (Max VC for upright posture, sequencing and attention to task)     AROM: Within functional limits  Strength: Generally decreased, functional  Coordination: Within functional limits  Tone: Normal  Sensation: Intact  ADL  Additional Comments: pt declined ADLs     Activity Tolerance  Activity Tolerance: Patient limited by fatigue;Patient limited by pain; Patient limited by endurance;Treatment limited secondary to decreased cognition  Bed mobility  Supine to Sit: 2 Person assistance;Minimal assistance (VCs for sequencing, assist for trunk)  Sit to Supine: Moderate assistance;2 Person assistance (assist for legs and trunk)        Cognition  Overall Cognitive Status: Exceptions  Arousal/Alertness: Appropriate responses to stimuli  Following Commands: Follows one step commands with repetition  Attention Span: Difficulty dividing attention; Attends with cues to redirect  Memory: Appears intact  Safety Judgement: Decreased awareness of need for assistance;Decreased awareness of need for safety  Problem Solving: Assistance required to identify errors made;Assistance required to generate solutions  Insights: Decreased awareness of deficits  Initiation: Requires cues for some  Sequencing: Requires cues for some                  Education Given To: Patient; Family  Education Provided: Role of Therapy;Plan of Care;Transfer Training;ADL Adaptive Strategies; Fall Prevention Strategies; Family Education; Energy Conservation  Education Method: Demonstration;Verbal  Barriers to Learning: Cognition  Education Outcome: Verbalized understanding;Continued education needed                                                                     AM-PAC Score        AM-[facility] Inpatient Daily Activity Raw Score: 16 (06/20/22 1528)  AM-PAC Inpatient ADL T-Scale Score : 35.96 (06/20/22 1528)  ADL Inpatient CMS 0-100% Score: 53.32 (06/20/22 1528)  ADL Inpatient CMS G-Code Modifier : CK (06/20/22 1528)    Goals  Short Term Goals  Time Frame for Short term goals: DC  Short Term Goal 1: pt will complete supine to sit with min assist-not met  Short Term Goal 2: pt will complete functional transfers with max assist and LRAD-not met  Short Term Goal 3: pt will complete LB dressing with mod assist-not met  Patient Goals   Patient goals : not state       Therapy Time   Individual Concurrent Group Co-treatment   Time In 1425         Time Out 1505         Minutes 40              Timed code tx minutes:25  Total tx minutes: Zoya Srinivasan 3, OT

## 2022-06-20 NOTE — PLAN OF CARE
Problem: Discharge Planning  Goal: Discharge to home or other facility with appropriate resources  Outcome: Progressing  Flowsheets (Taken 6/19/2022 2000)  Discharge to home or other facility with appropriate resources: Identify barriers to discharge with patient and caregiver     Problem: Pain  Goal: Verbalizes/displays adequate comfort level or baseline comfort level  Outcome: Progressing  No pain noted      Problem: Safety - Adult  Goal: Free from fall injury  Outcome: Progressing  No falls noted, call light in reach, bed in lowest position and locked, non slip socks placed on patient, bed alarm in place     Problem: Skin/Tissue Integrity  Goal: Absence of new skin breakdown  Description: 1. Monitor for areas of redness and/or skin breakdown  2. Assess vascular access sites hourly  3. Every 4-6 hours minimum:  Change oxygen saturation probe site  4. Every 4-6 hours:  If on nasal continuous positive airway pressure, respiratory therapy assess nares and determine need for appliance change or resting period. Outcome: Progressing     Problem: Respiratory - Adult  Goal: Achieves optimal ventilation and oxygenation  Outcome: Progressing  RR WNL. Patient saturating above 90% on 3 L NC. No respiratory distress noted at this time.

## 2022-06-20 NOTE — PLAN OF CARE
Problem: Discharge Planning  Goal: Discharge to home or other facility with appropriate resources  Outcome: Progressing  Flowsheets (Taken 6/20/2022 1718)  Discharge to home or other facility with appropriate resources:   Identify barriers to discharge with patient and caregiver   Identify discharge learning needs (meds, wound care, etc)   Refer to discharge planning if patient needs post-hospital services based on physician order or complex needs related to functional status, cognitive ability or social support system   Arrange for needed discharge resources and transportation as appropriate   Arrange for interpreters to assist at discharge as needed     Problem: Safety - Adult  Goal: Free from fall injury  Outcome: Progressing  Flowsheets  Taken 6/20/2022 1718  Free From Fall Injury:   Instruct family/caregiver on patient safety   Based on caregiver fall risk screen, instruct family/caregiver to ask for assistance with transferring infant if caregiver noted to have fall risk factors  Taken 6/20/2022 0701  Free From Fall Injury:   Instruct family/caregiver on patient safety   Based on caregiver fall risk screen, instruct family/caregiver to ask for assistance with transferring infant if caregiver noted to have fall risk factors     Problem: Respiratory - Adult  Goal: Achieves optimal ventilation and oxygenation  Outcome: Progressing  Flowsheets (Taken 6/20/2022 1718)  Achieves optimal ventilation and oxygenation:   Assess for changes in respiratory status   Position to facilitate oxygenation and minimize respiratory effort   Initiate smoking cessation protocol as indicated   Assess the need for suctioning and aspirate as needed   Respiratory therapy support as indicated   Assess and instruct to report shortness of breath or any respiratory difficulty   Encourage broncho-pulmonary hygiene including cough, deep breathe, incentive spirometry   Oxygen supplementation based on oxygen saturation or arterial blood gases   Assess for changes in mentation and behavior     Problem: Pain  Goal: Verbalizes/displays adequate comfort level or baseline comfort level  Flowsheets (Taken 6/20/2022 5459)  Verbalizes/displays adequate comfort level or baseline comfort level:   Assess pain using appropriate pain scale   Implement non-pharmacological measures as appropriate and evaluate response   Notify Licensed Independent Practitioner if interventions unsuccessful or patient reports new pain   Encourage patient to monitor pain and request assistance   Administer analgesics based on type and severity of pain and evaluate response   Consider cultural and social influences on pain and pain management

## 2022-06-20 NOTE — PROGRESS NOTES
HOSPITAL MEDICINE  - PROGRESS NOTE    Admit Date: 6/18/2022         Interval History:   64 y.o. female,hx of prior CVA following LEONILA in 2019 with residual rt sided hemiparesis, breast Ca s/p treatment, DM2,HTN,HLD , chr back pain who presented after a fall     Alejandraorestes Rollinsge at her [facility] point-3rd fall in a few days. Says she has been shaky and is \"falling apart\" . She has left leg paresis attributed to previous stroke, and is generally WC bound. Appears had CVA in 2019 after hip surgery. Was about to transfer into her wheelchair when she felt shaky and fell hitting her head, left arm. 3rd fall recently. In the ED,she was 86% on room air and was placed on 3 L   No dysuria but has had frequency     Her urine was nitrite positive with no pyuria -she reports freq and chr UTI     - CXR showed possible infiltrate vs atelectasis. She was started on empiric antibx for Pneumonia       Subjective:   Feels falls sec to generalized weakness; states has had worsening in her weakness  Reports some pain from left shoulder from where she was \"picked up\" after her fall    Pxt is talkative; feels improved    No leg edema. Says she had some prior to admission. Daughter in law, a clinical pharmacist is in the room. Objective: afebrile      Diet: ADULT DIET;  Regular; 5 carb choices (75 gm/meal)       Data:   Scheduled Meds: Reviewed  Continuous Infusions:   sodium chloride 25 mL (06/19/22 1617)    dextrose         Intake/Output Summary (Last 24 hours) at 6/20/2022 1517  Last data filed at 6/20/2022 1106  Gross per 24 hour   Intake --   Output 900 ml   Net -900 ml     CBC:   Recent Labs     06/19/22  1001   WBC 6.3   HGB 12.8        BMP:  Recent Labs     06/19/22  1001      K 3.8   CL 98*   CO2 31   BUN 13   CREATININE 0.6   GLUCOSE 188*          Objective:   Vitals: /65   Pulse 80   Temp 97.8 °F (36.6 °C) (Oral)   Resp 17   Ht 5' recurrent falls  - Tolerating rocephin: Will favor treating with Cephalosporins at least 10 days total, IV---> PO  - She has recurrent UTIs: states she has seen a urologist in the past.   - Check bladder scans        Fall; recurrent falls  - Likely multifactorial:-sec to generalized debility, complicated UTI, with background gen weakness, hx of CVA;  Right LE weakness, hx of spinal stenosis  -CT head and cervical spine: nil acute  - Neg orthostatic vitals  - Treat UTI  - PT/OT eval  - Pxt has limited mobility baseline, uses WC for ambulation        Bilateral LE spasms  -Says has resolved with muscle relaxants started day prior       DM2,controlled  accuchecks qac and hs  SSI       Essential hypertension, benign  Ct home meds-HCTZ and lisinopril       Bilateral LE edema-chr   - Suspect possible fluid overload  - Echo; LE dopplers ordered  - Compression socks as previous       Spinal stenosis  - Ct home meds  - PT/OT         Hypothyroidism  - On synthroid  TSH normal        Hx of CVA with residual hemiplegia   Neg CT head  Pt wheel chair bound but able to transfer  PT/OT       Chronic nonalcoholic liver disease  Baseline          Disposition:  PT/OT  Possible DC IN abt 2 DAYS       Johana Coley MD

## 2022-06-21 LAB
ANION GAP SERPL CALCULATED.3IONS-SCNC: 16 MMOL/L (ref 3–16)
ATYPICAL LYMPHOCYTE RELATIVE PERCENT: 5 % (ref 0–6)
BASOPHILS ABSOLUTE: 0 K/UL (ref 0–0.2)
BASOPHILS RELATIVE PERCENT: 0 %
BUN BLDV-MCNC: 25 MG/DL (ref 7–20)
CALCIUM SERPL-MCNC: 8.6 MG/DL (ref 8.3–10.6)
CHLORIDE BLD-SCNC: 96 MMOL/L (ref 99–110)
CO2: 23 MMOL/L (ref 21–32)
CREAT SERPL-MCNC: 0.7 MG/DL (ref 0.6–1.2)
EOSINOPHILS ABSOLUTE: 0.1 K/UL (ref 0–0.6)
EOSINOPHILS RELATIVE PERCENT: 1 %
GFR AFRICAN AMERICAN: >60
GFR NON-AFRICAN AMERICAN: >60
GLUCOSE BLD-MCNC: 130 MG/DL (ref 70–99)
GLUCOSE BLD-MCNC: 153 MG/DL (ref 70–99)
GLUCOSE BLD-MCNC: 155 MG/DL (ref 70–99)
GLUCOSE BLD-MCNC: 195 MG/DL (ref 70–99)
GLUCOSE BLD-MCNC: 256 MG/DL (ref 70–99)
HCT VFR BLD CALC: 41.5 % (ref 36–48)
HEMOGLOBIN: 13.7 G/DL (ref 12–16)
LV EF: 58 %
LVEF MODALITY: NORMAL
LYMPHOCYTES ABSOLUTE: 1.3 K/UL (ref 1–5.1)
LYMPHOCYTES RELATIVE PERCENT: 13 %
MAGNESIUM: 1.8 MG/DL (ref 1.8–2.4)
MCH RBC QN AUTO: 25.9 PG (ref 26–34)
MCHC RBC AUTO-ENTMCNC: 33.1 G/DL (ref 31–36)
MCV RBC AUTO: 78.4 FL (ref 80–100)
MONOCYTES ABSOLUTE: 0.4 K/UL (ref 0–1.3)
MONOCYTES RELATIVE PERCENT: 5 %
NEUTROPHILS ABSOLUTE: 5.5 K/UL (ref 1.7–7.7)
NEUTROPHILS RELATIVE PERCENT: 76 %
PDW BLD-RTO: 16.8 % (ref 12.4–15.4)
PERFORMED ON: ABNORMAL
PLATELET # BLD: 173 K/UL (ref 135–450)
PMV BLD AUTO: 7.6 FL (ref 5–10.5)
POTASSIUM SERPL-SCNC: 3.8 MMOL/L (ref 3.5–5.1)
RBC # BLD: 5.29 M/UL (ref 4–5.2)
SODIUM BLD-SCNC: 135 MMOL/L (ref 136–145)
TROPONIN: <0.01 NG/ML
WBC # BLD: 7.3 K/UL (ref 4–11)

## 2022-06-21 PROCEDURE — 1200000000 HC SEMI PRIVATE

## 2022-06-21 PROCEDURE — 2580000003 HC RX 258: Performed by: INTERNAL MEDICINE

## 2022-06-21 PROCEDURE — 36415 COLL VENOUS BLD VENIPUNCTURE: CPT

## 2022-06-21 PROCEDURE — 85025 COMPLETE CBC W/AUTO DIFF WBC: CPT

## 2022-06-21 PROCEDURE — 6360000002 HC RX W HCPCS: Performed by: INTERNAL MEDICINE

## 2022-06-21 PROCEDURE — 97535 SELF CARE MNGMENT TRAINING: CPT

## 2022-06-21 PROCEDURE — 6360000004 HC RX CONTRAST MEDICATION: Performed by: INTERNAL MEDICINE

## 2022-06-21 PROCEDURE — C8929 TTE W OR WO FOL WCON,DOPPLER: HCPCS

## 2022-06-21 PROCEDURE — 97530 THERAPEUTIC ACTIVITIES: CPT

## 2022-06-21 PROCEDURE — 6370000000 HC RX 637 (ALT 250 FOR IP): Performed by: INTERNAL MEDICINE

## 2022-06-21 PROCEDURE — 83735 ASSAY OF MAGNESIUM: CPT

## 2022-06-21 PROCEDURE — 80048 BASIC METABOLIC PNL TOTAL CA: CPT

## 2022-06-21 PROCEDURE — 84484 ASSAY OF TROPONIN QUANT: CPT

## 2022-06-21 RX ADMIN — SODIUM CHLORIDE: 9 INJECTION, SOLUTION INTRAVENOUS at 15:11

## 2022-06-21 RX ADMIN — SODIUM CHLORIDE, PRESERVATIVE FREE 10 ML: 5 INJECTION INTRAVENOUS at 08:36

## 2022-06-21 RX ADMIN — LEVOTHYROXINE SODIUM 100 MCG: 0.1 TABLET ORAL at 05:20

## 2022-06-21 RX ADMIN — METHOCARBAMOL 750 MG: 750 TABLET ORAL at 20:26

## 2022-06-21 RX ADMIN — ENOXAPARIN SODIUM 40 MG: 100 INJECTION SUBCUTANEOUS at 08:34

## 2022-06-21 RX ADMIN — LISINOPRIL 10 MG: 10 TABLET ORAL at 08:34

## 2022-06-21 RX ADMIN — INSULIN GLARGINE 10 UNITS: 100 INJECTION, SOLUTION SUBCUTANEOUS at 21:20

## 2022-06-21 RX ADMIN — PERFLUTREN 1.65 MG: 6.52 INJECTION, SUSPENSION INTRAVENOUS at 10:29

## 2022-06-21 RX ADMIN — POLYETHYLENE GLYCOL 3350 17 G: 17 POWDER, FOR SOLUTION ORAL at 08:34

## 2022-06-21 RX ADMIN — ATORVASTATIN CALCIUM 20 MG: 20 TABLET, FILM COATED ORAL at 08:35

## 2022-06-21 RX ADMIN — POLYETHYLENE GLYCOL 3350 17 G: 17 POWDER, FOR SOLUTION ORAL at 20:26

## 2022-06-21 RX ADMIN — CEFTRIAXONE 1000 MG: 1 INJECTION, POWDER, FOR SOLUTION INTRAMUSCULAR; INTRAVENOUS at 15:13

## 2022-06-21 RX ADMIN — METHOCARBAMOL 750 MG: 750 TABLET ORAL at 08:35

## 2022-06-21 RX ADMIN — INSULIN LISPRO 1 UNITS: 100 INJECTION, SOLUTION INTRAVENOUS; SUBCUTANEOUS at 08:40

## 2022-06-21 RX ADMIN — ASPIRIN 81 MG: 81 TABLET, CHEWABLE ORAL at 08:35

## 2022-06-21 RX ADMIN — Medication 5 MG: at 20:26

## 2022-06-21 RX ADMIN — HYDROCHLOROTHIAZIDE 25 MG: 25 TABLET ORAL at 08:34

## 2022-06-21 RX ADMIN — TIMOLOL MALEATE 1 DROP: 5 SOLUTION OPHTHALMIC at 08:35

## 2022-06-21 RX ADMIN — SALINE NASAL SPRAY 1 SPRAY: 1.5 SOLUTION NASAL at 18:30

## 2022-06-21 RX ADMIN — INSULIN LISPRO 3 UNITS: 100 INJECTION, SOLUTION INTRAVENOUS; SUBCUTANEOUS at 12:27

## 2022-06-21 RX ADMIN — LATANOPROST 1 DROP: 50 SOLUTION OPHTHALMIC at 20:30

## 2022-06-21 RX ADMIN — INSULIN LISPRO 1 UNITS: 100 INJECTION, SOLUTION INTRAVENOUS; SUBCUTANEOUS at 21:20

## 2022-06-21 RX ADMIN — SODIUM CHLORIDE, PRESERVATIVE FREE 10 ML: 5 INJECTION INTRAVENOUS at 21:27

## 2022-06-21 RX ADMIN — METHOCARBAMOL 750 MG: 750 TABLET ORAL at 12:23

## 2022-06-21 RX ADMIN — METHOCARBAMOL 750 MG: 750 TABLET ORAL at 16:44

## 2022-06-21 RX ADMIN — LISINOPRIL 10 MG: 10 TABLET ORAL at 20:26

## 2022-06-21 RX ADMIN — SALINE NASAL SPRAY 1 SPRAY: 1.5 SOLUTION NASAL at 20:26

## 2022-06-21 ASSESSMENT — PAIN SCALES - GENERAL
PAINLEVEL_OUTOF10: 5
PAINLEVEL_OUTOF10: 7
PAINLEVEL_OUTOF10: 5
PAINLEVEL_OUTOF10: 6

## 2022-06-21 ASSESSMENT — PAIN DESCRIPTION - ORIENTATION
ORIENTATION_2: RIGHT
ORIENTATION: LEFT
ORIENTATION_2: LEFT
ORIENTATION: LEFT
ORIENTATION_2: RIGHT
ORIENTATION_2: LEFT

## 2022-06-21 ASSESSMENT — PAIN DESCRIPTION - LOCATION
LOCATION_2: SHOULDER
LOCATION_2: SHOULDER
LOCATION: HIP
LOCATION: HIP
LOCATION_2: HIP
LOCATION: SHOULDER
LOCATION_2: HIP
LOCATION: SHOULDER

## 2022-06-21 ASSESSMENT — PAIN DESCRIPTION - FREQUENCY
FREQUENCY: INTERMITTENT
FREQUENCY: CONTINUOUS
FREQUENCY: CONTINUOUS
FREQUENCY: INTERMITTENT

## 2022-06-21 ASSESSMENT — PAIN - FUNCTIONAL ASSESSMENT
PAIN_FUNCTIONAL_ASSESSMENT: PREVENTS OR INTERFERES SOME ACTIVE ACTIVITIES AND ADLS
PAIN_FUNCTIONAL_ASSESSMENT_SITE2: PREVENTS OR INTERFERES SOME ACTIVE ACTIVITIES AND ADLS
PAIN_FUNCTIONAL_ASSESSMENT: PREVENTS OR INTERFERES SOME ACTIVE ACTIVITIES AND ADLS
PAIN_FUNCTIONAL_ASSESSMENT_SITE2: PREVENTS OR INTERFERES SOME ACTIVE ACTIVITIES AND ADLS

## 2022-06-21 ASSESSMENT — PAIN DESCRIPTION - ONSET
ONSET: OTHER (COMMENT)
ONSET: ON-GOING
ONSET: PROGRESSIVE
ONSET: ON-GOING

## 2022-06-21 ASSESSMENT — PAIN DESCRIPTION - PAIN TYPE
TYPE: CHRONIC PAIN
TYPE: ACUTE PAIN
TYPE: ACUTE PAIN
TYPE: CHRONIC PAIN

## 2022-06-21 ASSESSMENT — PAIN DESCRIPTION - DESCRIPTORS
DESCRIPTORS_2: SHARP
DESCRIPTORS_2: ACHING
DESCRIPTORS: ACHING
DESCRIPTORS: ACHING
DESCRIPTORS: SHARP
DESCRIPTORS_2: ACHING
DESCRIPTORS: SHARP
DESCRIPTORS_2: SHARP

## 2022-06-21 ASSESSMENT — PAIN DESCRIPTION - INTENSITY
RATING_2: 7
RATING_2: 7
RATING_2: 5

## 2022-06-21 NOTE — PROGRESS NOTES
Physical Therapy  Facility/Department: [place] [address] [facility]  Daily Treatment Note  NAME: Cynthia Valdes  : 1945  MRN: [id number]    Date of Service: 2022    Discharge Recommendations:  Cynthia Valdes scored a 1124 on the AM-PAC short mobility form. Current research shows that an AM-PAC score of 17 or less is typically not associated with a discharge to the patient's home setting. Based on the patient's AM-PAC score and their current functional mobility deficits, it is recommended that the patient have 3-5 sessions per week of Physical Therapy at d/c to increase the patient's independence. Please see assessment section for further patient specific details. PT Equipment Recommendations  Equipment Needed: No (defer to next level of care)    Patient Diagnosis(es): The primary encounter diagnosis was Community acquired pneumonia, unspecified laterality. A diagnosis of Hypoxemia was also pertinent to this visit. Assessment   Assessment: Patient tolerated session fair with mobility being limited due to fatigue and overall decreased activity tolerance. Patient able to transfer to EOB and to stand with max assist as above. Patient performed two static standing trials, first for 30 seconds with mod assist and second for 20 seconds with mod assist, weight shifted onto LLE. Patient dependent for pericare and brief management in standing position and was incontinent of urine upon arrival (assisted to change gown/pad/sheets and wipe up with bath wipes). Patient unable to take steps (non-ambulatory at baseline) and no chair available for pivot to bedside chair. RN notified that patient would be able to utilize gato stedy and assist x 1-2 for out of bed transfers; she verbalized understanding. Patient continues to function below baseline level. Is anxious throughout treatment; requiring verbal cues for encouragement and motivation. Recommend continued skilled PT upon discharge.   Will follow while in acute care setting to maximize independence with mobility. Activity Tolerance: Patient limited by fatigue;Patient limited by endurance  Equipment Needed: No (defer to next level of care)     Plan    Plan  Plan:  (2-5)  Current Treatment Recommendations: Strengthening;Balance training;Functional mobility training;Transfer training; Endurance training; Therapeutic activities; Safety education & training     Restrictions  Position Activity Restriction  Other position/activity restrictions: up with assist     Subjective    Subjective  Subjective: Patient supine in bed upon arrival, agreeable to PT treatment with encouragement. Pain: patient reports chronic pain in joints and muscles, not rated, positioned for comfort at end of session  Orientation  Overall Orientation Status: Within Functional Limits  Cognition  Overall Cognitive Status: Exceptions  Arousal/Alertness: Appropriate responses to stimuli  Following Commands: Follows one step commands with repetition  Attention Span: Difficulty attending to directions; Difficulty dividing attention; Attends with cues to redirect  Memory: Decreased recall of recent events  Safety Judgement: Decreased awareness of need for assistance;Decreased awareness of need for safety  Problem Solving: Decreased awareness of errors;Assistance required to correct errors made;Assistance required to identify errors made  Insights: Decreased awareness of deficits  Initiation: Requires cues for some  Sequencing: Requires cues for some     Objective   Vitals     Bed Mobility Training  Bed Mobility Training: Yes  Overall Level of Assistance: Maximum assistance  Rolling: Minimum assistance  Supine to Sit: Maximum assistance (head of bed elevated, increased time to complete, verbal cues for sequencing)  Sit to Supine: Maximum assistance;Assist X2 (max assist x 2; head of bed flat)  Scooting:  Total assistance;Assist X2;Moderate assistance (mod assist to scoot toward edge of bed, total assist x 2 to scoot toward head of bed in supine)  Balance  Sitting: With support (SBA to sit EOB with UE support)  Sitting - Static: Occasional  Standing: Impaired (Mod Ax1 for 30 sec + 20 sec static stance)  Transfer Training  Transfer Training: Yes  Overall Level of Assistance: Maximum assistance  Interventions: Tactile cues; Verbal cues  Sit to Stand: Maximum assistance (from EOB x 2 trials)  Stand to Sit: Maximum assistance (to EOB x 2 trials)  Gait Training  Gait Training: No (patient non-ambulatory at baseline)  Wheelchair Management  Wheelchair Management: No           Safety Devices  Type of Devices: Bed alarm in place;Call light within reach; Left in bed;Nurse notified;Gait belt       Goals  Short Term Goals  Time Frame for Short term goals: upon discharge  Short term goal 1: Pt will complete bed mobility with CGA x 1  Short term goal 2: Pt will complete stand pivot transfer with CGA x 1  Short term goal 3: Pt will demonstrate fair + dynamic sitting  Patient Goals   Patient goals : to return home    Education  Patient Education  Education Given To: Patient  Education Provided: Role of Therapy;Plan of Care;Transfer Training  Education Method: Verbal  Education Outcome: Verbalized understanding;Continued education needed    Therapy Time   Individual Concurrent Group Co-treatment   Time In 1352         Time Out 1430         Minutes 38         Timed Code Treatment Minutes: 38 Minutes     If patient discharges prior to next session this note will serve as a discharge summary. Please see below for the latest assessment towards goals.    Mary [address]

## 2022-06-21 NOTE — CARE COORDINATION
Case Management Assessment           Daily Note                 Date/ Time of Note: 6/21/2022 2:30 PM         Note completed by: Robert Hale RN    Patient Name: Dennise Faith  YOB: 1945    Diagnosis:Hypoxemia [R09.02]  Acute respiratory failure with hypoxia ([address] [J96.01]  Community acquired pneumonia, unspecified laterality [J18.9]  Patient Admission Status: Inpatient    Date of Admission:6/18/2022  4:03 AM Length of Stay: 3 GLOS: GMLOS: 3.1    Current Plan of Care: cardio following, weaning O2 (5L- none at baseline), IV abx  ________________________________________________________________________________________  PT AM-PAC: 11 / 24 per last evaluation on: 06.20.2022    OT AM-PAC: 16 / 24 per last evaluation on: 06.20.2022    DME Needs for discharge:   ________________________________________________________________________________________  Discharge Plan: Other: AL at [facility]    Tentative discharge date: TBD    Current barriers to discharge: Weaning O2, medical stability    Referrals completed: Not Applicable    Resources/ information provided: Not indicated at this time  ________________________________________________________________________________________  Case Management Notes: [facility]RAYDEN continues to follow for DC planning and needs. Patient remains on 5L O2, getting IV abx. CM faxed updated therapy evals with recs to Clay Guadarrama in admissions with [facility] for review to ensure they are able to accept patient back to their non-skilled unit upon return where patient can get therapy daily until able to return to her AL apartment at [facility]. Jennifre Wade and her family were provided with choice of provider; she and her family are in agreement with the discharge plan.     Care Transition Patient: Anuradha Hale RN  [facility]  Case Management Department  Ph: [phone]  Fax: [phone]

## 2022-06-21 NOTE — PLAN OF CARE
Problem: Discharge Planning  Goal: Discharge to home or other facility with appropriate resources  6/21/2022 0247 by Rob Barksdale RN  Outcome: Progressing  Flowsheets (Taken 6/20/2022 2000)  Discharge to home or other facility with appropriate resources: Identify barriers to discharge with patient and caregiver     Problem: Pain  Goal: Verbalizes/displays adequate comfort level or baseline comfort level  6/21/2022 0247 by Rob Barksdale RN  Outcome: Progressing  No pain noted     Problem: Safety - Adult  Goal: Free from fall injury  6/21/2022 0247 by Rob Barksdale RN  Outcome: Progressing  Flowsheets (Taken 6/20/2022 1956)  Free From Fall Injury: Instruct family/caregiver on patient safety  Call light in reach, bed alarm in place, bed in lowest position and locked, non slip socks on patient, bed rails x3. Problem: Skin/Tissue Integrity  Goal: Absence of new skin breakdown  Description: 1. Monitor for areas of redness and/or skin breakdown  2. Assess vascular access sites hourly  3. Every 4-6 hours minimum:  Change oxygen saturation probe site  4. Every 4-6 hours:  If on nasal continuous positive airway pressure, respiratory therapy assess nares and determine need for appliance change or resting period.   Outcome: Progressing     Problem: Respiratory - Adult  Goal: Achieves optimal ventilation and oxygenation  6/21/2022 0247 by Rob Barksdale RN  Outcome: Progressing  Flowsheets (Taken 6/20/2022 2000)  Achieves optimal ventilation and oxygenation:   Assess for changes in respiratory status   Assess for changes in mentation and behavior   Position to facilitate oxygenation and minimize respiratory effort   Oxygen supplementation based on oxygen saturation or arterial blood gases   Initiate smoking cessation protocol as indicated   Encourage broncho-pulmonary hygiene including cough, deep breathe, incentive spirometry   Assess the need for suctioning and aspirate as needed   Assess and instruct to report shortness of breath or any respiratory difficulty   Respiratory therapy support as indicated       Problem: ABCDS Injury Assessment  Goal: Absence of physical injury  Outcome: Progressing

## 2022-06-21 NOTE — FLOWSHEET NOTE
06/21/22 1503   Encounter Summary   Encounter Overview/Reason  Initial Encounter   Service Provided For: Patient   Referral/Consult From: [place] MD   Support System Unknown   Last Encounter  06/21/22  (wfl)   Complexity of Encounter Low   Begin Time 1440   End Time  1450   Total Time Calculated 10 min   Encounter    Type Initial Screen/Assessment   Assessment/Intervention/Outcome   Assessment Anxious   Intervention Active listening   Outcome Expressed Gratitude   Patient explained that although she would very much appreciate a visit, she needed to focus on trying to use the bathroom and then taking a nap. Consider a visit tomorrow.

## 2022-06-21 NOTE — PLAN OF CARE
fall injury  6/21/2022 1447 by Danyell Moss RN  Outcome: Progressing  Flowsheets (Taken 6/21/2022 1001)  Free From Fall Injury:   Instruct family/caregiver on patient safety   Based on caregiver fall risk screen, instruct family/caregiver to ask for assistance with transferring infant if caregiver noted to have fall risk factors     Problem: Skin/Tissue Integrity  Goal: Absence of new skin breakdown  Description: 1. Monitor for areas of redness and/or skin breakdown  2. Assess vascular access sites hourly  3. Every 4-6 hours minimum:  Change oxygen saturation probe site  4. Every 4-6 hours:  If on nasal continuous positive airway pressure, respiratory therapy assess nares and determine need for appliance change or resting period.   6/21/2022 1447 by Danyell Moss RN  Outcome: Progressing     Problem: Respiratory - Adult  Goal: Achieves optimal ventilation and oxygenation  6/21/2022 1447 by Danyell Moss RN  Outcome: Progressing  Flowsheets  Taken 6/21/2022 1200  Achieves optimal ventilation and oxygenation:   Assess for changes in respiratory status   Assess for changes in mentation and behavior   Position to facilitate oxygenation and minimize respiratory effort   Oxygen supplementation based on oxygen saturation or arterial blood gases   Encourage broncho-pulmonary hygiene including cough, deep breathe, incentive spirometry  Taken 6/21/2022 0800  Achieves optimal ventilation and oxygenation:   Assess for changes in respiratory status   Assess for changes in mentation and behavior   Oxygen supplementation based on oxygen saturation or arterial blood gases   Position to facilitate oxygenation and minimize respiratory effort   Encourage broncho-pulmonary hygiene including cough, deep breathe, incentive spirometry     Problem: ABCDS Injury Assessment  Goal: Absence of physical injury  6/21/2022 1447 by Danyell Moss RN  Outcome: Progressing  Flowsheets (Taken 6/21/2022 1001)  Absence of Physical Injury: Implement safety measures based on patient assessment

## 2022-06-21 NOTE — PROGRESS NOTES
Occupational Therapy  Facility/Department: [place] [address] [facility]  Occupational Therapy Daily Treatment    Name: Aleja Dunlap  : 1945  MRN: [id number]  Date of Service: 2022    Discharge Recommendations:  Aleja Dunlap scored a 13/24 on the AM-PAC ADL Inpatient form. Current research shows that an AM-PAC score of 17 or less is typically not associated with a discharge to the patient's home setting. Based on the patient's AM-PAC score and their current ADL deficits, it is recommended that the patient have 3-5 sessions per week of Occupational Therapy at d/c to increase the patient's independence. Please see assessment section for further patient specific details. If patient discharges prior to next session this note will serve as a discharge summary. Please see below for the latest assessment towards goals. OT Equipment Recommendations  Equipment Needed: No  Other: defer       Patient Diagnosis(es): The primary encounter diagnosis was Community acquired pneumonia, unspecified laterality. A diagnosis of Hypoxemia was also pertinent to this visit. Past Medical History:  has a past medical history of Cancer [facility]), Cerebral artery occlusion with cerebral infarction ([facility] [address] Glaucoma, Hyperlipidemia, Hypertension, [place] (nonalcoholic steatohepatitis), PPD positive, Thyroid disease, and Type II or unspecified type diabetes mellitus without mention of complication, not stated as uncontrolled. Past Surgical History:  has a past surgical history that includes Tonsillectomy (5 years ago); Appendectomy (14 years ago); Dilation and curettage of uterus (); Breast surgery; Breast biopsy (); Colonoscopy (2011); joint replacement (Right); other surgical history (16); and Mastectomy, bilateral.    Treatment Diagnosis: functional mobility/ADL deficit      Assessment   Performance deficits / Impairments: Decreased functional mobility ; Decreased strength;Decreased ADL status; Decreased balance;Decreased safe awareness;Decreased endurance  Assessment: Pt cont to be limited by decreased balance and functional mobility. Pt required Max A for sit<>stand transfers this date and maintained static standing balance w/ Mod Ax1. Pt cont to require total A for LE dressing and toileting. Pt is from [facility] and cont to present significantly below baseline (pt previously able to pivot from bed<>wc). Pt would benefit from cont skilled OT services upon dc to maximize safety and functional independence. Will cont to follow  Treatment Diagnosis: functional mobility/ADL deficit  REQUIRES OT FOLLOW-UP: Yes  Activity Tolerance  Activity Tolerance: Patient Tolerated treatment well;Patient limited by fatigue;Treatment limited secondary to decreased cognition        Plan   Plan  Times per Week: 2-5  Times per Day: Daily  Current Treatment Recommendations: Strengthening,Balance training,Functional mobility training,Endurance training,Patient/Caregiver education & training,Safety education & training,Equipment evaluation, education, & procurement,Self-Care / ADL     Restrictions  Position Activity Restriction  Other position/activity restrictions: up with assist    Subjective   General  Chart Reviewed: Yes  Patient assessed for rehabilitation services?: Yes  Additional Pertinent Hx: 64 y.o. female,hx of prior CVA following LEONILA in 2019 with residual rt sided hemiparesis,breast Ca s/p treatment,DM2,HTN,HLD ,chr back pain who presents after a fall [facility]-3rd fall in a few days.  Was about to transfer into her wheelchair when she felt shaky and fell hitting her head,left arm  Family / Caregiver Present: Yes (family present at beginning of session)  Referring Practitioner: Marguerite Pugh MD  Diagnosis: Acute respiratory failure with hypoxia  Subjective  Subjective: Pt in bed upon arrival, pleasant and agreeable to OT session  Pain: patient reports chronic pain in joints and muscles, not rated, positioned for comfort at end of session    Objective     Vision Exceptions: Wears glasses at all times  Hearing: Within functional limits          Safety Devices  Type of Devices: Bed alarm in place;Call light within reach; Left in bed;Nurse notified;Gait belt  Bed Mobility Training  Bed Mobility Training: Yes  Overall Level of Assistance: Maximum assistance  Rolling: Minimum assistance  Supine to Sit: Maximum assistance (head of bed elevated, increased time to complete, verbal cues for sequencing)  Sit to Supine: Maximum assistance;Assist X2 (max assist x 2; head of bed flat)  Scooting: Total assistance;Assist X2;Moderate assistance (mod assist to scoot toward edge of bed, total assist x 2 to scoot toward head of bed in supine)  Balance  Sitting: With support (SBA to sit EOB with UE support)  Sitting - Static: Occasional  Standing: Impaired (Mod Ax1 for 30 sec + 20 sec static stance)  Transfer Training  Transfer Training: Yes  Overall Level of Assistance: Maximum assistance  Interventions: Tactile cues; Verbal cues  Sit to Stand: Maximum assistance (from EOB x 2 trials)  Stand to Sit: Maximum assistance (to EOB x 2 trials)  Gait Training  Gait Training: No (patient non-ambulatory at baseline)  Wheelchair Management  Wheelchair Management: No        ADL  Grooming: Stand by assistance  Grooming Skilled Clinical Factors: Pt seated EOB w/ SBA for sitting balance to wash face w/ bathing wipes. Pt required VC and setup assist to initiate task  UE Dressing: Minimal assistance  UE Dressing Skilled Clinical Factors: don/doff gown seated EOB w/ Min A to tie in back and VC to initiate.  pt's gown saturated w/ urine upon arrival, pt unaware and w/ decreased insight into importance of maintaining skin integrity  LE Dressing: Dependent/Total  LE Dressing Skilled Clinical Factors: don/doff brief  Toileting: Dependent/Total  Toileting Skilled Clinical Factors: Pt incontinent of urine upon arrival. total A for clothing mgmt at hips and to complete lobo hygiene in stance + Mod Ax1 for standing balance     Activity Tolerance  Activity Tolerance: Patient limited by fatigue;Patient limited by endurance           Cognition  Overall Cognitive Status: Exceptions  Arousal/Alertness: Appropriate responses to stimuli  Following Commands: Follows one step commands with repetition  Attention Span: Difficulty attending to directions; Difficulty dividing attention; Attends with cues to redirect  Memory: Decreased recall of recent events  Safety Judgement: Decreased awareness of need for assistance;Decreased awareness of need for safety  Problem Solving: Decreased awareness of errors;Assistance required to correct errors made;Assistance required to identify errors made  Insights: Decreased awareness of deficits  Initiation: Requires cues for some  Sequencing: Requires cues for some                  Education Given To: Patient  Education Provided: Role of Therapy;Plan of Care;Transfer Training;ADL Adaptive Strategies; Fall Prevention Strategies; Energy Conservation  Education Method: Demonstration;Verbal  Barriers to Learning: Cognition  Education Outcome: Verbalized understanding;Continued education needed                        G-Code     OutComes Score                                                  AM-PAC Score        AM-[facility] Inpatient Daily Activity Raw Score: 13 (06/21/22 1447)  AM-PAC Inpatient ADL T-Scale Score : 32.03 (06/21/22 1447)  ADL Inpatient CMS 0-100% Score: 63.03 (06/21/22 1447)  ADL Inpatient CMS G-Code Modifier : CL (06/21/22 1447)    Goals  Short Term Goals  Time Frame for Short term goals: DC  Short Term Goal 1: pt will complete supine to sit with min assist- not met  Short Term Goal 2: pt will complete functional transfers with max assist and LRAD- goal met for sit<>stand 6/21  Short Term Goal 3: pt will complete LB dressing with mod assist- not met  Patient Goals   Patient goals : not stated       Therapy Time   Individual Concurrent Group Co-treatment   Time In [facility]         Time Out [address]

## 2022-06-22 ENCOUNTER — APPOINTMENT (OUTPATIENT)
Dept: GENERAL RADIOLOGY | Age: 77
DRG: 189 | End: 2022-06-22
Payer: MEDICARE

## 2022-06-22 LAB
ANION GAP SERPL CALCULATED.3IONS-SCNC: 10 MMOL/L (ref 3–16)
BASOPHILS ABSOLUTE: 0.1 K/UL (ref 0–0.2)
BASOPHILS RELATIVE PERCENT: 1.7 %
BLOOD CULTURE, ROUTINE: NORMAL
BUN BLDV-MCNC: 34 MG/DL (ref 7–20)
CALCIUM SERPL-MCNC: 8.6 MG/DL (ref 8.3–10.6)
CHLORIDE BLD-SCNC: 98 MMOL/L (ref 99–110)
CO2: 28 MMOL/L (ref 21–32)
CREAT SERPL-MCNC: 0.8 MG/DL (ref 0.6–1.2)
CULTURE, BLOOD 2: NORMAL
EOSINOPHILS ABSOLUTE: 0.2 K/UL (ref 0–0.6)
EOSINOPHILS RELATIVE PERCENT: 2.9 %
GFR AFRICAN AMERICAN: >60
GFR NON-AFRICAN AMERICAN: >60
GLUCOSE BLD-MCNC: 153 MG/DL (ref 70–99)
GLUCOSE BLD-MCNC: 157 MG/DL (ref 70–99)
GLUCOSE BLD-MCNC: 162 MG/DL (ref 70–99)
GLUCOSE BLD-MCNC: 170 MG/DL (ref 70–99)
GLUCOSE BLD-MCNC: 214 MG/DL (ref 70–99)
HCT VFR BLD CALC: 42.4 % (ref 36–48)
HEMOGLOBIN: 13.5 G/DL (ref 12–16)
LYMPHOCYTES ABSOLUTE: 1.6 K/UL (ref 1–5.1)
LYMPHOCYTES RELATIVE PERCENT: 25.5 %
MCH RBC QN AUTO: 25.6 PG (ref 26–34)
MCHC RBC AUTO-ENTMCNC: 31.9 G/DL (ref 31–36)
MCV RBC AUTO: 80.3 FL (ref 80–100)
MONOCYTES ABSOLUTE: 0.6 K/UL (ref 0–1.3)
MONOCYTES RELATIVE PERCENT: 8.7 %
NEUTROPHILS ABSOLUTE: 3.9 K/UL (ref 1.7–7.7)
NEUTROPHILS RELATIVE PERCENT: 61.2 %
PDW BLD-RTO: 16.7 % (ref 12.4–15.4)
PERFORMED ON: ABNORMAL
PLATELET # BLD: 144 K/UL (ref 135–450)
PMV BLD AUTO: 7.4 FL (ref 5–10.5)
POTASSIUM SERPL-SCNC: 4.2 MMOL/L (ref 3.5–5.1)
RBC # BLD: 5.28 M/UL (ref 4–5.2)
SODIUM BLD-SCNC: 136 MMOL/L (ref 136–145)
WBC # BLD: 6.3 K/UL (ref 4–11)

## 2022-06-22 PROCEDURE — 36415 COLL VENOUS BLD VENIPUNCTURE: CPT

## 2022-06-22 PROCEDURE — 51798 US URINE CAPACITY MEASURE: CPT

## 2022-06-22 PROCEDURE — 2700000000 HC OXYGEN THERAPY PER DAY

## 2022-06-22 PROCEDURE — 6370000000 HC RX 637 (ALT 250 FOR IP): Performed by: INTERNAL MEDICINE

## 2022-06-22 PROCEDURE — 6360000002 HC RX W HCPCS: Performed by: INTERNAL MEDICINE

## 2022-06-22 PROCEDURE — 71046 X-RAY EXAM CHEST 2 VIEWS: CPT

## 2022-06-22 PROCEDURE — 94761 N-INVAS EAR/PLS OXIMETRY MLT: CPT

## 2022-06-22 PROCEDURE — 97530 THERAPEUTIC ACTIVITIES: CPT

## 2022-06-22 PROCEDURE — 85025 COMPLETE CBC W/AUTO DIFF WBC: CPT

## 2022-06-22 PROCEDURE — 2580000003 HC RX 258: Performed by: INTERNAL MEDICINE

## 2022-06-22 PROCEDURE — 1200000000 HC SEMI PRIVATE

## 2022-06-22 PROCEDURE — 97110 THERAPEUTIC EXERCISES: CPT

## 2022-06-22 PROCEDURE — 80048 BASIC METABOLIC PNL TOTAL CA: CPT

## 2022-06-22 RX ADMIN — METHOCARBAMOL 750 MG: 750 TABLET ORAL at 09:18

## 2022-06-22 RX ADMIN — INSULIN LISPRO 1 UNITS: 100 INJECTION, SOLUTION INTRAVENOUS; SUBCUTANEOUS at 21:12

## 2022-06-22 RX ADMIN — POLYETHYLENE GLYCOL 3350 17 G: 17 POWDER, FOR SOLUTION ORAL at 09:20

## 2022-06-22 RX ADMIN — ASPIRIN 81 MG: 81 TABLET, CHEWABLE ORAL at 09:18

## 2022-06-22 RX ADMIN — TIMOLOL MALEATE 1 DROP: 5 SOLUTION OPHTHALMIC at 09:20

## 2022-06-22 RX ADMIN — SALINE NASAL SPRAY 1 SPRAY: 1.5 SOLUTION NASAL at 09:20

## 2022-06-22 RX ADMIN — METHOCARBAMOL 750 MG: 750 TABLET ORAL at 18:00

## 2022-06-22 RX ADMIN — LEVOTHYROXINE SODIUM 100 MCG: 0.1 TABLET ORAL at 05:01

## 2022-06-22 RX ADMIN — ATORVASTATIN CALCIUM 20 MG: 20 TABLET, FILM COATED ORAL at 09:19

## 2022-06-22 RX ADMIN — METHOCARBAMOL 750 MG: 750 TABLET ORAL at 21:11

## 2022-06-22 RX ADMIN — INSULIN GLARGINE 10 UNITS: 100 INJECTION, SOLUTION SUBCUTANEOUS at 21:13

## 2022-06-22 RX ADMIN — INSULIN LISPRO 1 UNITS: 100 INJECTION, SOLUTION INTRAVENOUS; SUBCUTANEOUS at 13:07

## 2022-06-22 RX ADMIN — INSULIN LISPRO 1 UNITS: 100 INJECTION, SOLUTION INTRAVENOUS; SUBCUTANEOUS at 09:11

## 2022-06-22 RX ADMIN — CEFTRIAXONE 1000 MG: 1 INJECTION, POWDER, FOR SOLUTION INTRAMUSCULAR; INTRAVENOUS at 15:38

## 2022-06-22 RX ADMIN — Medication 5 MG: at 21:11

## 2022-06-22 RX ADMIN — HYDROCHLOROTHIAZIDE 25 MG: 25 TABLET ORAL at 09:19

## 2022-06-22 RX ADMIN — LATANOPROST 1 DROP: 50 SOLUTION OPHTHALMIC at 21:25

## 2022-06-22 RX ADMIN — SALINE NASAL SPRAY 1 SPRAY: 1.5 SOLUTION NASAL at 05:01

## 2022-06-22 RX ADMIN — METHOCARBAMOL 750 MG: 750 TABLET ORAL at 13:13

## 2022-06-22 RX ADMIN — SODIUM CHLORIDE, PRESERVATIVE FREE 10 ML: 5 INJECTION INTRAVENOUS at 09:18

## 2022-06-22 RX ADMIN — SODIUM CHLORIDE, PRESERVATIVE FREE 10 ML: 5 INJECTION INTRAVENOUS at 21:12

## 2022-06-22 RX ADMIN — ENOXAPARIN SODIUM 40 MG: 100 INJECTION SUBCUTANEOUS at 09:19

## 2022-06-22 RX ADMIN — INSULIN LISPRO 1 UNITS: 100 INJECTION, SOLUTION INTRAVENOUS; SUBCUTANEOUS at 18:01

## 2022-06-22 RX ADMIN — LISINOPRIL 10 MG: 10 TABLET ORAL at 21:11

## 2022-06-22 RX ADMIN — LISINOPRIL 10 MG: 10 TABLET ORAL at 09:18

## 2022-06-22 ASSESSMENT — PAIN SCALES - GENERAL
PAINLEVEL_OUTOF10: 7
PAINLEVEL_OUTOF10: 0
PAINLEVEL_OUTOF10: 6
PAINLEVEL_OUTOF10: 0
PAINLEVEL_OUTOF10: 6

## 2022-06-22 ASSESSMENT — PAIN DESCRIPTION - DESCRIPTORS
DESCRIPTORS: ACHING
DESCRIPTORS: ACHING;DISCOMFORT

## 2022-06-22 ASSESSMENT — PAIN - FUNCTIONAL ASSESSMENT: PAIN_FUNCTIONAL_ASSESSMENT: PREVENTS OR INTERFERES SOME ACTIVE ACTIVITIES AND ADLS

## 2022-06-22 ASSESSMENT — PAIN DESCRIPTION - ONSET
ONSET: ON-GOING
ONSET: ON-GOING

## 2022-06-22 ASSESSMENT — PAIN DESCRIPTION - LOCATION
LOCATION: HIP
LOCATION: HIP

## 2022-06-22 ASSESSMENT — PAIN DESCRIPTION - ORIENTATION
ORIENTATION: RIGHT
ORIENTATION: RIGHT

## 2022-06-22 ASSESSMENT — PAIN DESCRIPTION - FREQUENCY
FREQUENCY: CONTINUOUS
FREQUENCY: CONTINUOUS

## 2022-06-22 ASSESSMENT — PAIN DESCRIPTION - PAIN TYPE
TYPE: CHRONIC PAIN
TYPE: CHRONIC PAIN

## 2022-06-22 NOTE — PROGRESS NOTES
Occupational Therapy  Facility/Department: [place] [address] [facility]  Daily Treatment    Name: Cynthia Valdes  : 1945  MRN: [id number]  Date of Service: 2022    Discharge Recommendations:  Cynthia Valdes scored a 13/24 on the AM-PAC ADL Inpatient form. Current research shows that an AM-PAC score of 17 or less is typically not associated with a discharge to the patient's home setting. Based on the patient's AM-PAC score and their current ADL deficits, it is recommended that the patient have 3-5 sessions per week of Occupational Therapy at d/c to increase the patient's independence. Please see assessment section for further patient specific details. If patient discharges prior to next session this note will serve as a discharge summary. Please see below for the latest assessment towards goals. OT Equipment Recommendations  Equipment Needed: No  Other: defer       Patient Diagnosis(es): The primary encounter diagnosis was Community acquired pneumonia, unspecified laterality. A diagnosis of Hypoxemia was also pertinent to this visit. Past Medical History:  has a past medical history of Cancer [facility]), Cerebral artery occlusion with cerebral infarction ([facility] [address] Glaucoma, Hyperlipidemia, Hypertension, [place] (nonalcoholic steatohepatitis), PPD positive, Thyroid disease, and Type II or unspecified type diabetes mellitus without mention of complication, not stated as uncontrolled. Past Surgical History:  has a past surgical history that includes Tonsillectomy (5 years ago); Appendectomy (14 years ago); Dilation and curettage of uterus (); Breast surgery; Breast biopsy (); Colonoscopy (2011); joint replacement (Right); other surgical history (16); and Mastectomy, bilateral.    Treatment Diagnosis: functional mobility/ADL deficit 2/2 hypoxemia    Assessment   Performance deficits / Impairments: Decreased functional mobility ; Decreased strength;Decreased ADL status; Decreased balance;Decreased safe awareness;Decreased endurance;Decreased cognition  Assessment: After treatment, pt found to be presenting with the above mentioned deficits. Pt would benefit from continued skilled occupational therapy to address these deficits, increasing safety and independence with ADL and functional mobility. Pt is from [address] [place] [address] and was previously able to pivot from bed<>wc). Last therapy session with OOB activity, pt needing max A to stand. Refusing OOB/EOB this date. Pt would benefit from cont skilled OT services upon dc to maximize safety and functional independence. Will cont to follow   Treatment Diagnosis: functional mobility/ADL deficit 2/2 hypoxemia  Prognosis: Good  REQUIRES OT FOLLOW-UP: Yes  Activity Tolerance  Activity Tolerance: Patient Tolerated treatment well        Plan   Plan  Times per Week: 2-5  Times per Day: Daily  Current Treatment Recommendations: Strengthening,Balance training,Functional mobility training,Endurance training,Patient/Caregiver education & training,Safety education & training,Equipment evaluation, education, & procurement,Self-Care / ADL     Restrictions  Position Activity Restriction  Other position/activity restrictions: up with assist    Subjective   General  Chart Reviewed: Yes  Patient assessed for rehabilitation services?: Yes  Additional Pertinent Hx: 64 y.o. female,hx of prior CVA following LEONILA in 2019 with residual rt sided hemiparesis,breast Ca s/p treatment,DM2,HTN,HLD ,chr back pain who presents after a fall [facility]-3rd fall in a few days. Was about to transfer into her wheelchair when she felt shaky and fell hitting her head,left arm  Family / Caregiver Present: No  Referring Practitioner: Irvin Davis MD  Diagnosis: Acute respiratory failure with hypoxia  Subjective  Subjective: Pt in bed upon arrival, pleasant and agreeable to OT session. Agreeable to supine level activity only. \"I've been up and down for tests all day. \"        Social/Functional History  Social/Functional History  Lives With: Alone  Type of Home: Assisted living  Home Layout: One level  Home Access: Level entry  Bathroom Shower/Tub: Walk-in shower  Bathroom Toilet: Handicap height  Bathroom Equipment: Grab bars in shower,Tub transfer bench,Grab bars around toilet  Home Equipment: Wheelchair-manual,Grab bars,Walker, 4 wheeled (flat bed with side rails)  Has the patient had two or more falls in the past year or any fall with injury in the past year?: Yes (falls getting in/out of bed > WC, 3 times in 6 months)  Receives Help From: Home health  ADL Assistance: Needs assistance (needs assist for showers, dresses and toilets independently)  Toileting: Independent (effortful, with difficulty)  Homemaking Assistance: Needs assistance (AL provides meals, aids assist with cleaning and laundry)  Ambulation Assistance: Independent  Transfer Assistance: Independent  Active : No  Additional Comments: pt claims she is having increased difficulty       Objective              Safety Devices  Type of Devices: Bed alarm in place;Call light within reach; Left in bed;Nurse notified;Gait belt              ADL  Additional Comments: Pt declined ADL           Bed mobility  Roll bilaterally with rails: SBA with VCs for technique  Boost in bed: TA x2  Bed Mobility Comments: Pt declined activity out of supine despite education and encouragement. Cognition  Overall Cognitive Status: Exceptions  Arousal/Alertness: Appropriate responses to stimuli  Following Commands: Follows one step commands with repetition  Attention Span: Difficulty attending to directions; Difficulty dividing attention; Attends with cues to redirect  Memory: Decreased recall of recent events  Safety Judgement: Decreased awareness of need for assistance;Decreased awareness of need for safety  Problem Solving: Decreased awareness of errors;Assistance required to correct errors made;Assistance required to identify errors made  Insights: Decreased awareness of deficits  Initiation: Requires cues for some  Sequencing: Requires cues for some                                      Therapeutic Exercise  Pt completed 1 set, 15 reps of B digit, wrist, elbow, and shoulder flex/ext, supination/pronation, and L ankle, knee, hip flex/ext (B elbow, R shoulder, sup/pron with ~1/2# resistance) while supine after education. L hip ab/dduction and thoracic stretches in prep for rolling. Education: Role of OT, safe t/f training, safe use of DME, awareness of deficits, discharge planning, ADL as therapeutic exercise, importance of OOB, therex       AM-PAC Score        AM-PAC Inpatient Daily Activity Raw Score: 13 (06/22/22 1420)  AM-PAC Inpatient ADL T-Scale Score : 32.03 (06/22/22 1420)  ADL Inpatient CMS 0-100% Score: 63.03 (06/22/22 1420)  ADL Inpatient CMS G-Code Modifier : CL (06/22/22 1420)    Goals  Short Term Goals  Time Frame for Short term goals: DC  Short Term Goal 1: pt will complete supine to sit with min assist- not met  Short Term Goal 2: pt will complete functional transfers with max assist and LRAD- goal met for sit<>stand 6/21  Short Term Goal 3: pt will complete LB dressing with mod assist- not met  Patient Goals   Patient goals : \"I want to be able to t/f to my w/c like I was able to before. \"       Therapy Time   Individual Concurrent Group Co-treatment   Time In 6089         Time Out 1414         Minutes 29         Timed Code Treatment Minutes: 34 Minutes       If patient is discharged prior to next treatment session, this note will serve as the discharge summary.   Jarett Landin, OTR/L #[id number]

## 2022-06-22 NOTE — PLAN OF CARE
Problem: Pain  Goal: Verbalizes/displays adequate comfort level or baseline comfort level  Outcome: Progressing  Patient has continued to deny pain today. She has a history of chronic pain issues and remains on scheduled robaxin. Instructed to notify of change in level of comfort. Verbalized understanding. Will monitor and treat as needed. Problem: Respiratory - Adult  Goal: Achieves optimal ventilation and oxygenation  6/22/2022 1854 by Bryant Duran RN  Outcome: Progressing  Respirations have remained even and unlabored. Denies shortness of breath or difficulty breathing. Oxygen remains at 3 liters. Will continue to monitor. Problem: Safety - Adult  Goal: Free from fall injury  Outcome: Progressing  Patient is alert and oriented. Uses call light to verbalize needs. In bed with alarm activated and call light in reach. Will continue to keep room well lit and free of clutter. Will continue to monitor for safety. 6/22/2022 3787 by Cornelius Taveras RN  Outcome: Progressing  Flowsheets (Taken 6/22/2022 0982)  Achieves optimal ventilation and oxygenation:   Assess for changes in respiratory status   Oxygen supplementation based on oxygen saturation or arterial blood gases  Note: Overnight pt has maintained SpO2 > 90% on 4L NC with respiratory rate 18-20 breaths/min and no complaints of shortness of breath or dyspnea.

## 2022-06-22 NOTE — PROGRESS NOTES
HOSPITAL MEDICINE  - PROGRESS NOTE    Admit Date: 6/18/2022         Interval History:   64 y.o. 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 Says she has been shaky and is \"falling apart\" . She has left leg paresis attributed to previous stroke, and is generally WC bound. Appears had CVA in 2019 after hip surgery. Was about to transfer into her wheelchair when she felt shaky and fell hitting her head, left arm. 3rd fall recently. In the ED,she was 86% on room air and was placed on 3 L   No dysuria but has had frequency     Her urine was nitrite positive with no pyuria -she reports freq and chr UTI     - CXR showed 6/18 possible infiltrate vs atelectasis. She was started on empiric antibx for Pneumonia       Subjective:   Feels falls sec to generalized weakness; states has had worsening in her weakness  Reports some pain from left shoulder from where she was \"picked up\" after her fall    Pxt is talkative; feels improved    No leg edema. Says she had some prior to admission. Pt reports she has a lot of nasal congestion, she feels the oxygen is not useful because she is so congested. No SOB, mild non productive cough. Today pt returned from f/u CXR and had been off O2 for at least an hour. Pulse OX registered 86-88% RA. Placed onn 2L O2 and improved to 90-94%. No change in SX's. Denies SOB. CXR shows mild improvement. Objective: afebrile      Diet: ADULT DIET;  Regular; 5 carb choices (75 gm/meal)       Data:   Scheduled Meds: Reviewed  Continuous Infusions:   sodium chloride 5 mL/hr at 06/21/22 1511    dextrose         Intake/Output Summary (Last 24 hours) at 6/22/2022 1512  Last data filed at 6/22/2022 1114  Gross per 24 hour   Intake 493.93 ml   Output 700 ml   Net -206.07 ml     CBC:   Recent Labs     06/22/22  0430   WBC 6.3   HGB 13.5        BMP:  Recent Labs     06/22/22  0430      K 4.2   CL 98*   CO2 28   BUN 34*   CREATININE 0.8   GLUCOSE 170*          Objective:   Vitals: BP (!) 140/62   Pulse 88   Temp 98.4 °F (36.9 °C) (Oral)   Resp 20   Ht 5' 4\" (1.626 m)   Wt 169 lb 12.1 oz (77 kg)   LMP  (LMP Unknown)   SpO2 92%   BMI 29.14 kg/m²   General appearance: alert, appears stated age and cooperative  Skin: Skin color, texture, turgor normal.   HEENT: Head: Normocephalic, no lesions, without obvious abnormality. Neck: SUPPLE  Lungs: clear to auscultation bilaterally  Heart: regular rate and rhythm, S1, S2 normal, no murmur, click, rub or gallop  Abdomen: soft, non-tender; bowel sounds normal; no masses,  no organomegaly  Extremities: extremities normal, atraumatic, no cyanosis or edema   CNS: awake, alert. Right leg power: 1+. Appears full power other limbs. Neurologic: Mental status: Alert, oriented, thought content appropriate    Echo:      Summary   Left ventricular cavity size is normal. There is mild concentric left   ventricular hypertrophy. Overall left ventricular systolic function appears   normal with an ejection fraction of 55-60%. No regional wall motion   abnormalities are noted. Indeterminate diastolic function with normal LVEDP. Assessment & Plan:     64 y.o. female with recurrent falls         Acute Hypoxic Respiratory failure: POA  - O2 86-87 % on RA in ED  - Placed on 3 Liters. Now on 5L, 92%   - CT chest: No CT evidence of pulmonary embolism. Patchy peripheral airspace density, with areas of consolidation in the bilateral lower  lobes, particularly in the left base. - She is on antibx for UTI, possible pneumonia. - ; but she is obese. - Pxt has limited mobility baseline, uses WC for amb- Encouraged incentive spirometry  - Cont.  O2 supplement and wean as tolerated  -will treat nasal congestion with saline

## 2022-06-22 NOTE — PROGRESS NOTES
HOSPITAL MEDICINE  - PROGRESS NOTE    Admit Date: 6/18/2022         Interval History:   64 y.o. 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 Says she has been shaky and is \"falling apart\" . She has left leg paresis attributed to previous stroke, and is generally WC bound. Appears had CVA in 2019 after hip surgery. Was about to transfer into her wheelchair when she felt shaky and fell hitting her head, left arm. 3rd fall recently. In the ED,she was 86% on room air and was placed on 3 L   No dysuria but has had frequency     Her urine was nitrite positive with no pyuria -she reports freq and chr UTI     - CXR showed possible infiltrate vs atelectasis. She was started on empiric antibx for Pneumonia       Subjective:   Feels falls sec to generalized weakness; states has had worsening in her weakness  Reports some pain from left shoulder from where she was \"picked up\" after her fall    Pxt is talkative; feels improved    No leg edema. Says she had some prior to admission. Pt reports she has a lot of nasal congestion, she feels the oxygen is not useful because she is so congested. No SOB, mild non productive cough. Objective: afebrile      Diet: ADULT DIET;  Regular; 5 carb choices (75 gm/meal)       Data:   Scheduled Meds: Reviewed  Continuous Infusions:   sodium chloride 5 mL/hr at 06/21/22 1511    dextrose         Intake/Output Summary (Last 24 hours) at 6/21/2022 2215  Last data filed at 6/21/2022 2127  Gross per 24 hour   Intake 723.93 ml   Output 1200 ml   Net -476.07 ml     CBC:   Recent Labs     06/21/22  0439   WBC 7.3   HGB 13.7        BMP:  Recent Labs     06/21/22  0439   *   K 3.8   CL 96*   CO2 23   BUN 25*   CREATININE 0.7   GLUCOSE 155* Objective:   Vitals: /77   Pulse (!) 101   Temp 98.6 °F (37 °C) (Oral)   Resp 18   Ht 5' 4\" (1.626 m)   Wt 172 lb 6.4 oz (78.2 kg)   LMP  (LMP Unknown)   SpO2 91%   BMI 29.59 kg/m²   General appearance: alert, appears stated age and cooperative  Skin: Skin color, texture, turgor normal.   HEENT: Head: Normocephalic, no lesions, without obvious abnormality. Neck: SUPPLE  Lungs: clear to auscultation bilaterally  Heart: regular rate and rhythm, S1, S2 normal, no murmur, click, rub or gallop  Abdomen: soft, non-tender; bowel sounds normal; no masses,  no organomegaly  Extremities: extremities normal, atraumatic, no cyanosis or edema   CNS: awake, alert. Right leg power: 1+. Appears full power other limbs. Neurologic: Mental status: Alert, oriented, thought content appropriate    Echo:      Summary   Left ventricular cavity size is normal. There is mild concentric left   ventricular hypertrophy. Overall left ventricular systolic function appears   normal with an ejection fraction of 55-60%. No regional wall motion   abnormalities are noted. Indeterminate diastolic function with normal LVEDP. Assessment & Plan:     64 y.o. female with recurrent falls         Acute Hypoxic Respiratory failure: POA  - O2 86-87 % on RA in ED  - Placed on 3 Liters. Now on 5L, 92%   - CT chest: No CT evidence of pulmonary embolism. Patchy peripheral airspace density, with areas of consolidation in the bilateral lower  lobes, particularly in the left base. - She is on antibx for UTI, possible pneumonia. - ; but she is obese. - Pxt has limited mobility baseline, uses WC for amb- Encouraged incentive spirometry  - Cont.  O2 supplement and wean as tolerated  -will treat nasal congestion with saline nasal spray, cont to try to wean O2      Pulmonary airspace disease  Possible pneumonia  Other possibility is CHF or atelectasis  - Pxt was hypoxic on presentation, and had abnormal chest imaging: started on antibx for possible pneumonia   - Procalcitonin: 0.06  - Cx: NGTD  - COVID: negative  - Continue antibx for UTI to complete course  - Encourage incentive spirometry        Complicated E.coli UTI- POA   - UTI with generalized weakness and recurrent falls  - Tolerating rocephin: Will favor treating with Cephalosporins at least 10 days total, IV---> PO  - She has recurrent UTIs: states she has seen a urologist in the past.   - Check bladder scans        Fall; recurrent falls  - Likely multifactorial:-sec to generalized debility, complicated UTI, with background gen weakness, hx of CVA;  Right LE weakness, hx of spinal stenosis  -CT head and cervical spine: nil acute  - Neg orthostatic vitals  - Treat UTI  - PT/OT eval  - Pxt has limited mobility baseline, uses WC for ambulation        Bilateral LE spasms  -Says has resolved with muscle relaxants started day prior       DM2,controlled  accuchecks qac and hs  SSI       Essential hypertension, benign  Ct home meds-HCTZ and lisinopril       Bilateral LE edema-chr   - Suspect possible fluid overload  - Echo; LE dopplers ordered  - Compression socks as previous       Spinal stenosis  - Ct home meds  - PT/OT         Hypothyroidism  - On synthroid  TSH normal        Hx of CVA with residual hemiplegia   Neg CT head  Pt wheel chair bound but able to transfer  PT/OT       Chronic nonalcoholic liver disease  Baseline          Disposition:  PT/OT  Possible DC IN abt 2 DAYS       Lupe Villagran MD

## 2022-06-22 NOTE — PROGRESS NOTES
Physical Therapy/Occupational Therapy    Hold note    Pt currently off floor. Will f/u later pm as time allows or per POC. RN aware.      Haile He, PT

## 2022-06-22 NOTE — CARE COORDINATION
CM following for discharge planning. Pt is from [place] [id number]. CM spoke with Driscoll at [facility] ([phone]) who stated family was interested in [facility]. Driscoll stated [facility] was holding a bed. CM reached out to Kari at [facility] and sent the referral, Kari states they are not holding a bed and she will look at it first thing in the morning. Pt will require a precert. Kari to follow up in am if can accept patient and start precert. CM met with pt's son and updated him on the referral process and the pending acceptance.      Jared Burgos RN, BSN, [address]  Case Management Department  [phone]

## 2022-06-22 NOTE — PLAN OF CARE
Problem: Respiratory - Adult  Goal: Achieves optimal ventilation and oxygenation  Outcome: Progressing  Flowsheets (Taken 6/22/2022 2236)  Achieves optimal ventilation and oxygenation:   Assess for changes in respiratory status   Oxygen supplementation based on oxygen saturation or arterial blood gases  Note: Overnight pt has maintained SpO2 > 90% on 4L NC with respiratory rate 18-20 breaths/min and no complaints of shortness of breath or dyspnea.

## 2022-06-23 LAB
ANION GAP SERPL CALCULATED.3IONS-SCNC: 12 MMOL/L (ref 3–16)
BASOPHILS ABSOLUTE: 0.1 K/UL (ref 0–0.2)
BASOPHILS RELATIVE PERCENT: 1.1 %
BUN BLDV-MCNC: 35 MG/DL (ref 7–20)
CALCIUM SERPL-MCNC: 8.9 MG/DL (ref 8.3–10.6)
CHLORIDE BLD-SCNC: 99 MMOL/L (ref 99–110)
CO2: 27 MMOL/L (ref 21–32)
CREAT SERPL-MCNC: 0.9 MG/DL (ref 0.6–1.2)
EOSINOPHILS ABSOLUTE: 0.2 K/UL (ref 0–0.6)
EOSINOPHILS RELATIVE PERCENT: 2.7 %
GFR AFRICAN AMERICAN: >60
GFR NON-AFRICAN AMERICAN: >60
GLUCOSE BLD-MCNC: 174 MG/DL (ref 70–99)
GLUCOSE BLD-MCNC: 179 MG/DL (ref 70–99)
GLUCOSE BLD-MCNC: 180 MG/DL (ref 70–99)
GLUCOSE BLD-MCNC: 201 MG/DL (ref 70–99)
GLUCOSE BLD-MCNC: 214 MG/DL (ref 70–99)
GLUCOSE BLD-MCNC: 228 MG/DL (ref 70–99)
HCT VFR BLD CALC: 39.9 % (ref 36–48)
HEMOGLOBIN: 13.2 G/DL (ref 12–16)
LYMPHOCYTES ABSOLUTE: 1.8 K/UL (ref 1–5.1)
LYMPHOCYTES RELATIVE PERCENT: 28.4 %
MCH RBC QN AUTO: 26 PG (ref 26–34)
MCHC RBC AUTO-ENTMCNC: 33 G/DL (ref 31–36)
MCV RBC AUTO: 78.8 FL (ref 80–100)
MONOCYTES ABSOLUTE: 0.4 K/UL (ref 0–1.3)
MONOCYTES RELATIVE PERCENT: 7.1 %
NEUTROPHILS ABSOLUTE: 3.8 K/UL (ref 1.7–7.7)
NEUTROPHILS RELATIVE PERCENT: 60.7 %
PDW BLD-RTO: 16.9 % (ref 12.4–15.4)
PERFORMED ON: ABNORMAL
PLATELET # BLD: 148 K/UL (ref 135–450)
PMV BLD AUTO: 7.4 FL (ref 5–10.5)
POTASSIUM SERPL-SCNC: 3.8 MMOL/L (ref 3.5–5.1)
RBC # BLD: 5.07 M/UL (ref 4–5.2)
SODIUM BLD-SCNC: 138 MMOL/L (ref 136–145)
WBC # BLD: 6.3 K/UL (ref 4–11)

## 2022-06-23 PROCEDURE — 80048 BASIC METABOLIC PNL TOTAL CA: CPT

## 2022-06-23 PROCEDURE — 6370000000 HC RX 637 (ALT 250 FOR IP): Performed by: INTERNAL MEDICINE

## 2022-06-23 PROCEDURE — 97530 THERAPEUTIC ACTIVITIES: CPT

## 2022-06-23 PROCEDURE — 6360000002 HC RX W HCPCS: Performed by: INTERNAL MEDICINE

## 2022-06-23 PROCEDURE — 2580000003 HC RX 258: Performed by: INTERNAL MEDICINE

## 2022-06-23 PROCEDURE — 85025 COMPLETE CBC W/AUTO DIFF WBC: CPT

## 2022-06-23 PROCEDURE — 1200000000 HC SEMI PRIVATE

## 2022-06-23 PROCEDURE — 36415 COLL VENOUS BLD VENIPUNCTURE: CPT

## 2022-06-23 PROCEDURE — 97116 GAIT TRAINING THERAPY: CPT

## 2022-06-23 RX ORDER — METHOCARBAMOL 750 MG/1
750 TABLET, FILM COATED ORAL 3 TIMES DAILY
Qty: 30 TABLET | Refills: 0 | Status: SHIPPED | OUTPATIENT
Start: 2022-06-23 | End: 2022-07-03

## 2022-06-23 RX ORDER — LISINOPRIL 5 MG/1
5 TABLET ORAL 2 TIMES DAILY
Status: DISCONTINUED | OUTPATIENT
Start: 2022-06-23 | End: 2022-06-25 | Stop reason: HOSPADM

## 2022-06-23 RX ORDER — LISINOPRIL 10 MG/1
5 TABLET ORAL DAILY
Qty: 180 TABLET | Refills: 1
Start: 2022-06-23

## 2022-06-23 RX ORDER — MECOBALAMIN 5000 MCG
5 TABLET,DISINTEGRATING ORAL NIGHTLY
Qty: 30 TABLET | Refills: 0
Start: 2022-06-23

## 2022-06-23 RX ADMIN — INSULIN LISPRO 2 UNITS: 100 INJECTION, SOLUTION INTRAVENOUS; SUBCUTANEOUS at 12:37

## 2022-06-23 RX ADMIN — INSULIN LISPRO 1 UNITS: 100 INJECTION, SOLUTION INTRAVENOUS; SUBCUTANEOUS at 17:00

## 2022-06-23 RX ADMIN — INSULIN LISPRO 1 UNITS: 100 INJECTION, SOLUTION INTRAVENOUS; SUBCUTANEOUS at 20:24

## 2022-06-23 RX ADMIN — ASPIRIN 81 MG: 81 TABLET, CHEWABLE ORAL at 08:53

## 2022-06-23 RX ADMIN — ENOXAPARIN SODIUM 40 MG: 100 INJECTION SUBCUTANEOUS at 08:53

## 2022-06-23 RX ADMIN — TIMOLOL MALEATE 1 DROP: 5 SOLUTION OPHTHALMIC at 08:49

## 2022-06-23 RX ADMIN — LISINOPRIL 5 MG: 5 TABLET ORAL at 20:23

## 2022-06-23 RX ADMIN — LEVOTHYROXINE SODIUM 100 MCG: 0.1 TABLET ORAL at 05:12

## 2022-06-23 RX ADMIN — SODIUM CHLORIDE, PRESERVATIVE FREE 10 ML: 5 INJECTION INTRAVENOUS at 20:23

## 2022-06-23 RX ADMIN — ATORVASTATIN CALCIUM 20 MG: 20 TABLET, FILM COATED ORAL at 08:53

## 2022-06-23 RX ADMIN — METHOCARBAMOL 750 MG: 750 TABLET ORAL at 20:23

## 2022-06-23 RX ADMIN — METHOCARBAMOL 750 MG: 750 TABLET ORAL at 08:53

## 2022-06-23 RX ADMIN — SALINE NASAL SPRAY 1 SPRAY: 1.5 SOLUTION NASAL at 08:49

## 2022-06-23 RX ADMIN — INSULIN LISPRO 1 UNITS: 100 INJECTION, SOLUTION INTRAVENOUS; SUBCUTANEOUS at 09:01

## 2022-06-23 RX ADMIN — HYDROCHLOROTHIAZIDE 25 MG: 25 TABLET ORAL at 08:53

## 2022-06-23 RX ADMIN — METHOCARBAMOL 750 MG: 750 TABLET ORAL at 12:38

## 2022-06-23 RX ADMIN — Medication 5 MG: at 20:23

## 2022-06-23 RX ADMIN — LISINOPRIL 10 MG: 10 TABLET ORAL at 08:53

## 2022-06-23 RX ADMIN — METHOCARBAMOL 750 MG: 750 TABLET ORAL at 16:19

## 2022-06-23 RX ADMIN — SODIUM CHLORIDE, PRESERVATIVE FREE 10 ML: 5 INJECTION INTRAVENOUS at 08:53

## 2022-06-23 RX ADMIN — INSULIN GLARGINE 10 UNITS: 100 INJECTION, SOLUTION SUBCUTANEOUS at 20:24

## 2022-06-23 RX ADMIN — CEFTRIAXONE 1000 MG: 1 INJECTION, POWDER, FOR SOLUTION INTRAMUSCULAR; INTRAVENOUS at 16:19

## 2022-06-23 RX ADMIN — POLYETHYLENE GLYCOL 3350 17 G: 17 POWDER, FOR SOLUTION ORAL at 08:53

## 2022-06-23 RX ADMIN — LATANOPROST 1 DROP: 50 SOLUTION OPHTHALMIC at 20:24

## 2022-06-23 ASSESSMENT — PAIN SCALES - GENERAL
PAINLEVEL_OUTOF10: 6
PAINLEVEL_OUTOF10: 5
PAINLEVEL_OUTOF10: 6

## 2022-06-23 ASSESSMENT — PAIN DESCRIPTION - LOCATION: LOCATION: HIP

## 2022-06-23 ASSESSMENT — PAIN DESCRIPTION - DESCRIPTORS: DESCRIPTORS: ACHING

## 2022-06-23 ASSESSMENT — PAIN DESCRIPTION - ORIENTATION: ORIENTATION: RIGHT

## 2022-06-23 NOTE — DISCHARGE SUMMARY
Hospital Discharge Summary    Patient's PCP: Molina Yeung MD  Admit Date: 6/18/2022   Discharge Date: 6/24/2022    Admitting Physician: Dr. Cristiane Vegas MD  Discharge Physician: Dr. Shonna Guy MD   Consults: none    HPI: 64 y. o. female,hx of prior CVA following LEONILA in 2019 with residual rt sided hemiparesis, breast Ca s/p treatment, DM2,HTN,HLD , chr back pain who presented after a fall     Barbsruthi Seeds at her [facility] point-3rd fall in a few days. Says she has been shaky and is \"falling apart\" .     She has left leg paresis attributed to previous stroke, and is generally WC bound.      Appears had CVA in 2019 after hip surgery.      Was about to transfer into her wheelchair when she felt shaky and fell hitting her head, left arm.     3rd fall recently.      In the ED,she was 86% on room air and was placed on 3 L   No dysuria but has had frequency      Her urine was nitrite positive with no pyuria -she reports freq and chr UTI      - CXR showed 6/18 possible infiltrate vs atelectasis. She was started on empiric antibx for Pneumonia      Brief hospital course:  Given the concern of the patients presentation and the concern of the possible multi-factorial etiology contributing to patients symptomatology. Patient was admitted and evaluated and found to have:        Discharge Diagnoses:            Acute Hypoxic Respiratory failure: POA  - O2 86-87 % on RA in ED  - Placed on 3 Liters. Now on 5L, 92%   - CT chest: No CT evidence of pulmonary embolism. Patchy peripheral airspace density, with areas of consolidation in the bilateral lower  lobes, particularly in the left base. - She is on antibx for UTI, possible pneumonia. - ; but she is obese. - Pxt has limited mobility baseline, uses WC for amb- Encouraged incentive spirometry  - Cont.  O2 supplement and wean as tolerated  -will treat nasal congestion with saline nasal spray, cont to try to wean O2        Pulmonary airspace disease  Possible pneumonia  Other possibility is CHF or atelectasis  - Pxt was hypoxic on presentation, and had abnormal chest imaging: started on antibx for possible pneumonia   - Procalcitonin: 0.06  - Cx: NGTD  - COVID: negative  - Continue antibx for UTI to complete course  - Encourage incentive spirometr- as above, repeat CXR today shows mild improvement.   -change to po abx, has been on rocephin, will change to po ceftin, monitor for change.            Complicated E.coli UTI- POA   - UTI with generalized weakness and recurrent falls  - Tolerating rocephin: Will favor treating with Cephalosporins at least 10 days total, IV---> PO  - She has recurrent UTIs: states she has seen a urologist in the past.   - Check bladder scans           Fall; recurrent falls  - Likely multifactorial:-sec to generalized debility, complicated UTI, with background gen weakness, hx of CVA;  Right LE weakness, hx of spinal stenosis  -CT head and cervical spine: nil acute  - Neg orthostatic vitals  - Treat UTI  - PT/OT eval  - Pxt has limited mobility baseline, uses WC for ambulation           Bilateral LE spasms  -Says has resolved with muscle relaxants started day prior        DM2,controlled  accuchecks qac and hs  SSI        Essential hypertension, benign  Ct home meds-HCTZ and lisinopril        Bilateral LE edema-chr   - Suspect possible fluid overload  - Echo; LE dopplers ordered  - Compression socks as previous        Spinal stenosis  - Ct home meds  - PT/OT           Hypothyroidism  - On synthroid  TSH normal           Physical Exam: /70   Pulse 76   Temp 98.6 °F (37 °C) (Oral)   Resp 16   Ht 5' 4\" (1.626 m)   Wt 170 lb 6.7 oz (77.3 kg)   LMP  (LMP Unknown)   SpO2 94%   BMI 29.25 kg/m²     Recent Labs     06/22/22  1709 06/22/22  1955 06/23/22  0723 06/23/22  0856 06/23/22  1226   POCGLU 162* 214* 179* 180* 228*       General appearance: alert, appears stated age and cooperative  Skin: Skin color, texture, turgor normal.   HEENT: tablet by mouth daily        ! ! - Potential duplicate medications found. Please discuss with provider. Activity: activity as tolerated  Diet: regular diet  Wound Care: none needed    Disposition: SNF  Discharged Condition: Stable  Follow Up: Primary Care Physician in one week    Total time spent on discharge, finalizing medications, referrals and arranging outpatient follow up was more than 30 minutes    Thank you Dr. Samm Cisneros MD for the opportunity to be involved in this patients care. If you have any questions or concerns please feel free to contact me at [phone].

## 2022-06-23 NOTE — PLAN OF CARE
Problem: Discharge Planning  Goal: Discharge to home or other facility with appropriate resources  6/23/2022 9823 by Mark Marcano RN  Outcome: Adequate for Discharge  Flowsheets (Taken 6/23/2022 6382)  Discharge to home or other facility with appropriate resources:   Arrange for needed discharge resources and transportation as appropriate   Identify discharge learning needs (meds, wound care, etc)     Problem: Pain  Goal: Verbalizes/displays adequate comfort level or baseline comfort level  6/23/2022 0643 by Mark Marcano RN  Outcome: Progressing  Flowsheets (Taken 6/23/2022 4269)  Verbalizes/displays adequate comfort level or baseline comfort level:   Encourage patient to monitor pain and request assistance   Assess pain using appropriate pain scale     Problem: Safety - Adult  Goal: Free from fall injury  6/23/2022 0643 by Mark Marcano RN  Outcome: Adequate for Discharge  Flowsheets (Taken 6/21/2022 1001 by Leland Sibley RN)  Free From Fall Injury:   Instruct family/caregiver on patient safety   Based on caregiver fall risk screen, instruct family/caregiver to ask for assistance with transferring infant if caregiver noted to have fall risk factors     Problem: Skin/Tissue Integrity  Goal: Absence of new skin breakdown  Description: 1. Monitor for areas of redness and/or skin breakdown  2. Assess vascular access sites hourly  3. Every 4-6 hours minimum:  Change oxygen saturation probe site  4. Every 4-6 hours:  If on nasal continuous positive airway pressure, respiratory therapy assess nares and determine need for appliance change or resting period.   6/23/2022 4080 by Mark Marcano RN  Outcome: Adequate for Discharge     Problem: Respiratory - Adult  Goal: Achieves optimal ventilation and oxygenation  6/23/2022 0643 by Mark Marcano RN  Outcome: Adequate for Discharge  Flowsheets (Taken 6/22/2022 0636 by Nereida Pablo RN)  Achieves optimal ventilation and oxygenation:   Assess for

## 2022-06-23 NOTE — CARE COORDINATION
Case Management Assessment           Daily Note                 Date/ Time of Note: 6/23/2022 11:02 AM         Note completed by: Claire Hernandez RN    Patient Name: Minal Dupont  YOB: 1945    Diagnosis:Hypoxemia [R09.02]  Acute respiratory failure with hypoxia ([address] [J96.01]  Community acquired pneumonia, unspecified laterality [J18.9]  Patient Admission Status: Inpatient    Date of Admission:6/18/2022  4:03 AM Length of Stay: 5 GLOS: GMLOS: 4.1    Current Plan of Care: weaning O2 (down to 2L this AM), BUN elevated, IV abx  ________________________________________________________________________________________  PT AM-PAC: 11 / 24 per last evaluation on: 06.22.2022    OT AM-PAC: 13 / 24 per last evaluation on: 06.22.2022    DME Needs for discharge:   ________________________________________________________________________________________  Discharge Plan: SNF: [facility] (pending acceptance, available bed and pre-cert)    Tentative discharge date: 06/24/22     Current barriers to discharge: Medical stability, accepting SNF and pre-cert    Referrals completed: Not Applicable    Resources/ information provided: Not indicated at this time  ________________________________________________________________________________________  Case Management Notes: CM continues to follow for DC planning and needs. CM received voicemail from Kari in admissions with [facility], she reports the NP for the SNF has reviewed clinicals for patient, noted patient was on 4L O2 and her BUN went up to 35, these were of concern to her and would like to see how patient does today and re-eval in AM and make decision regarding admission in AM 06/24/22. Patient has since been weaned down to 2L O2, will follow up with MD regarding increased BUN and plan. Patient is from [address] [place] [address] at [facility] and has priority bed acceptance for SNF at [facility], if accepted.  CM will provide alternate SNF list for patient and family to review if denied acceptance to [facility]. Patient will need [facility] Pre-cert for SNF admission, will need updated PT/OT evals for pre-cert. Darek Maldonado and her family were provided with choice of provider; she and her family are in agreement with the discharge plan.     Care Transition Patient: Anuradha Wetzel RN  [facility]  Case Management Department  Ph: [phone]  Fax: [phone]

## 2022-06-23 NOTE — PROGRESS NOTES
HOSPITAL MEDICINE  - PROGRESS NOTE    Admit Date: 6/18/2022         Interval History:   64 y.o. 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 Says she has been shaky and is \"falling apart\" . She has left leg paresis attributed to previous stroke, and is generally WC bound. Appears had CVA in 2019 after hip surgery. Was about to transfer into her wheelchair when she felt shaky and fell hitting her head, left arm. 3rd fall recently. In the ED,she was 86% on room air and was placed on 3 L   No dysuria but has had frequency     Her urine was nitrite positive with no pyuria -she reports freq and chr UTI     - CXR showed 6/18 possible infiltrate vs atelectasis. She was started on empiric antibx for Pneumonia         Subjective:   Feels falls sec to generalized weakness; states has had worsening in her weakness  Reports some pain from left shoulder from where she was \"picked up\" after her fall    Pxt is talkative; feels improved    No leg edema. Says she had some prior to admission. Pt reports she has a lot of nasal congestion, she feels the oxygen is not useful because she is so congested. No SOB, mild non productive cough. Today pt returned from f/u CXR and had been off O2 for at least an hour. Pulse OX registered 86-88% RA. Placed onn 2L O2 and improved to 90-94%. No change in SX's. Denies SOB. CXR shows mild improvement. Objective: afebrile      Diet: ADULT DIET;  Regular; 5 carb choices (75 gm/meal)       Data:   Scheduled Meds: Reviewed  Continuous Infusions:   sodium chloride 5 mL/hr at 06/21/22 1511    dextrose         Intake/Output Summary (Last 24 hours) at 6/23/2022 1451  Last data filed at 6/22/2022 2203  Gross per 24 hour   Intake 360 ml Output 500 ml   Net -140 ml     CBC:   Recent Labs     06/23/22  0452   WBC 6.3   HGB 13.2        BMP:  Recent Labs     06/23/22  0452      K 3.8   CL 99   CO2 27   BUN 35*   CREATININE 0.9   GLUCOSE 201*          Objective:   Vitals: BP 97/62   Pulse 76   Temp 98 °F (36.7 °C) (Oral)   Resp 16   Ht 5' 4\" (1.626 m)   Wt 170 lb 6.7 oz (77.3 kg)   LMP  (LMP Unknown)   SpO2 92%   BMI 29.25 kg/m²   General appearance: alert, appears stated age and cooperative  Skin: Skin color, texture, turgor normal.   HEENT: Head: Normocephalic, no lesions, without obvious abnormality. Neck: SUPPLE  Lungs: clear to auscultation bilaterally  Heart: regular rate and rhythm, S1, S2 normal, no murmur, click, rub or gallop  Abdomen: soft, non-tender; bowel sounds normal; no masses,  no organomegaly  Extremities: extremities normal, atraumatic, no cyanosis or edema   CNS: awake, alert. Right leg power: 1+. Appears full power other limbs. Neurologic: Mental status: Alert, oriented, thought content appropriate      Echo:    Summary   Left ventricular cavity size is normal. There is mild concentric left   ventricular hypertrophy. Overall left ventricular systolic function appears   normal with an ejection fraction of 55-60%. No regional wall motion   abnormalities are noted. Indeterminate diastolic function with normal LVEDP. Assessment & Plan:     64 y.o. female with recurrent falls       Acute Hypoxic Respiratory failure: POA  - O2 86-87 % on RA in ED  - Required up to 5L Oxygen  - CT chest: No CT evidence of pulmonary embolism. Patchy peripheral airspace density, with areas of consolidation in the bilateral lower  lobes, particularly in the left base. - She is on antibx for UTI, possible pneumonia. - ; but she is obese. - Pxt has limited mobility baseline, uses WC for amb- Encouraged incentive spirometry  - Cont. O2 supplement and wean as tolerated  - Treating nasal congestion with saline nasal spray. - Cont to wean O2 as able      Pulmonary airspace disease  Probable atelectasis; Possible mild CHF with preserved EF: POA  - Pxt was hypoxic on presentation, had LE edema  - CXR: Patchy peripheral airspace density, with areas of consolidation in the bilateral lower lobes, particularly in the left base. - Was started on antibx for possible pneumonia. Procalcitonin: 0.06.   - Cx: NGTD  - COVID: negative  - Overall, seems more likely atelectasis but cannot completely r/o mild CHF. Improved with some lasix, and incentive spirometry  - Pneumonia appears less likely, and considered ruled out at this time  - Has diuresed well, and appears euvolemic at this time. Likely will not nned to continue lasix, but muct monitor fluid status as o/p   - Continue antibx for UTI to complete course  - Encourage incentive spirometry- as above      Complicated E. coli UTI- POA   - UTI with generalized weakness and recurrent falls  - Tolerating rocephin: Will favor treating with Cephalosporins at least 10 days total, IV---> PO  - She has recurrent UTIs: states she has seen a urologist in the past.   - Check bladder scans      Fall; recurrent falls  - Likely multifactorial:-sec to generalized debility, complicated UTI, with background gen weakness, hx of CVA;  Right LE weakness, hx of spinal stenosis  -CT head and cervical spine: nil acute  - Neg orthostatic vitals  - Treat UTI  - PT/OT eval: SNF  - Pxt has limited mobility baseline, uses WC for ambulation        Bilateral LE spasms  -Says has resolved with muscle relaxants        DM2, controlled  Essential hypertension, benign  On HCTZ - lisinopril  Bps are soft  Will hold HCTZ for now, and decrease lisinopril dose       Spinal stenosis  - Ct home meds  - PT/OT      Hypothyroidism  - On synthroid  TSH normal        Hx of CVA with residual Right leg weakness  Appears right sided weakness, but mostly RLE   Neg CT head  Pt wheel chair bound but able to transfer  PT/OT       Chronic nonalcoholic liver disease  Baseline          Disposition:  PT/OT recs SNF, referaal to Trudy Johnson with Maru Lerner MD

## 2022-06-23 NOTE — PROGRESS NOTES
Occupational Therapy  Daily Treatment Note  Patient Name: Beatriz Bess  MRN: [id number]     Discharge Recommendation  Beatriz Bess scored a 14/24 on the AM-PAC ADL Inpatient form. Current research shows that an AM-PAC score of 17 or less is typically not associated with a discharge to the patient's home setting. Based on the patient's AM-PAC score and their current ADL deficits, it is recommended that the patient have 3-5 sessions per week of Occupational Therapy at d/c to increase the patient's independence. Please see assessment section for further patient specific details. If patient discharges prior to next session this note will serve as a discharge summary. Please see below for the latest assessment towards goals. Assessment: Pt demo gross weakness and deconditioning, requires assist of 1-2 for bed mobility, able to complete lateral transfer x2ft with max increased time and effort. Anticipate pt requires assist of 2 for pivot transfers and brief stance for toileting/LB ADL tasks. Pt has had multiple recent falls and continues to be a fall risk. Recommend ongoing OT tx at d/c      Discharge Recommendations:   Equipment Needs:  Defer to d/c setting    Chart Reviewed: Yes       Other position/activity restrictions: up with assist     Additional Pertinent Hx:76 y.o. female presented after fall at her AL. She has had several recent falls. Admitted with hypoxia, UTI, pneumonia. PMHx: CVA following LEONILA in 2019 with residual rt sided hemiparesis, breast Ca s/p treatment, DM2, HTN, HLD, chronic back pain       Diagnosis: Acute respiratory failure with hypoxia  Treatment Diagnosis: functional mobility/ADL deficit 2/2 hypoxemia    Subjective: Pt in bed upon OT/PT entry, agitated that therapy arrived just after her lunch did. \"I need a boost in bed and I want to eat! \" With education on therapy role, pt agreeable to trial sitting eob, declined transfer to chair despite encouragement    Pain: No pain at rest, both LEs very sensitive to light touch    Objective:    Cognition/Orientation: Decreased insight into deficits, decreased insight into need for assist for safety    Bed mobility   Supine to sit: Moderate assist (HOB raised, use of rail, max encouragement and cues for sequencing)  Sit to Supine: Dependent (Mod A of 2; assist trunk and LEs)  Lateral Scooting:  Contact guard assistance (pt able to perform lateral scoot (simulate slide board) x2 ft with max encouragement on EOB)- required >5 minutes to complete tasks, effortful      ADLs : declined ADLs or transfer to chair despite encouragement    Patient Education: Therapy role, d/c planning, benefits of OOB    Safety Devices in Place: remains in bed, alarm engaged, needs in reach, RN aware and family present       Plan:    Times per Week: 2-5   Times per Day: Daily    AM-PAC Score  AM-[facility] Inpatient Daily Activity Raw Score: 14 (06/23/22 1629)  AM-PAC Inpatient ADL T-Scale Score : 33.39 (06/23/22 1629)  ADL Inpatient CMS 0-100% Score: 59.67 (06/23/22 1629)  ADL Inpatient CMS G-Code Modifier : CK (06/23/22 1629)    Goals  Short Term Goals  Time Frame for Short term goals: DC  Short Term Goal 1: pt will complete supine to sit with min assist- not met  Short Term Goal 2: pt will complete functional transfers with max assist and LRAD- goal met for sit<>stand 6/21; not met  Short Term Goal 3: pt will complete LB dressing with mod assist- not met  Patient Goals   Patient goals : \"I want to be able to t/f to my w/c like I was able to before. \"       Therapy Time   Individual Concurrent Group Co-treatment   Time In 9307         Time Out 1340         Minutes 25         Timed Code Treatment Minutes: [address] [employer], OT

## 2022-06-23 NOTE — PROGRESS NOTES
Physical Therapy  Facility/Department: [facility] PCU  Physical Therapy Initial Assessment    Name: Manan Lraa  : 1945  MRN: [id number]  Date of Service: 2022    Discharge Recommendations:Dot Goins scored a  on the AM-PAC short mobility form. Current research shows that an AM-PAC score of 17 or less is typically not associated with a discharge to the patient's home setting. Based on the patient's AM-PAC score and their current functional mobility deficits, it is recommended that the patient have 3-5 sessions per week of Physical Therapy at d/c to increase the patient's independence. Please see assessment section for further patient specific details. If patient discharges prior to next session this note will serve as a discharge summary. Please see below for the latest assessment towards goals. Subacute/Skilled Nursing Facility,24 hour supervision or assist   PT Equipment Recommendations  Equipment Needed:  (defer to next level of care)      Patient Diagnosis(es): The primary encounter diagnosis was Community acquired pneumonia, unspecified laterality. A diagnosis of Hypoxemia was also pertinent to this visit. Past Medical History:  has a past medical history of Cancer [facility]), Cerebral artery occlusion with cerebral infarction ([facility] [address] Glaucoma, Hyperlipidemia, Hypertension, [place] (nonalcoholic steatohepatitis), PPD positive, Thyroid disease, and Type II or unspecified type diabetes mellitus without mention of complication, not stated as uncontrolled. Past Surgical History:  has a past surgical history that includes Tonsillectomy (5 years ago); Appendectomy (14 years ago); Dilation and curettage of uterus (); Breast surgery; Breast biopsy ();  Colonoscopy (2011); joint replacement (Right); other surgical history (16); and Mastectomy, bilateral.    Assessment   Body Structures, Functions, Activity Limitations Requiring Skilled Therapeutic Intervention: Decreased functional mobility ; Decreased strength;Decreased safe awareness;Decreased endurance;Decreased balance  Assessment: Pt continues to demo mobility below her reported baseline of AL apartment and independent with functional transfers; mainly w/c level. Pt is pursuing SNF placement prior to d/c back to AL. Pt would benefit from continued skilled IP PT at discharge to maximize her functional independence prior to d/ c home. Treatment Diagnosis: generalized weakness  Requires PT Follow-Up: Yes  Activity Tolerance  Activity Tolerance: Patient limited by fatigue;Patient limited by endurance     Plan   Plan  Plan:  (2-5)  Current Treatment Recommendations: Strengthening,Balance training,Functional mobility training,Transfer training,Endurance training,Therapeutic activities,Safety education & training  Safety Devices  Type of Devices: Bed alarm in place,Call light within reach,Left in bed,Nurse notified,Gait belt     Restrictions  Position Activity Restriction  Other position/activity restrictions: up with assist     Subjective   General  Chart Reviewed: Yes  Additional Pertinent Hx: The patient is a 64 y.o. 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 Says hsarnol has been shaky and is falling apart and has plans to see a Neurologist soon. Was about to transfer into her wheelchair when she felt shaky and fell hitting her head,left [place] the ED,she was  86% on room air and was placed on 3 L No dysuria but has had frequency-tells me nothing can cure her UTI which is chronicNo cough. No fever or chills. No PND or orthopnea Her urine was nitrite positive and CXR showed possible infiltrate vs atelectasis.  She was started on empiric antibx for Pneumonia Has been constipated for 3 days and has had decreased appetite  Family / Caregiver Present: Yes (son and DIL)  Diagnosis: Acute Resp Failure  Follows Commands: Within Functional Limits  Subjective  Subjective: Pt found supine in bed and agreeable to PT with max encouragement. \" I'm so much weaker than before. \"         Social/Functional History  Social/Functional History  Lives With: Alone  Type of Home: Assisted living  Home Layout: One level  Home Access: Level entry  Bathroom Shower/Tub: Walk-in shower  Bathroom Toilet: Handicap height  Bathroom Equipment: Grab bars in shower,Tub transfer bench,Grab bars around toilet  Home Equipment: Wheelchair-manual,Grab bars,Walker, 4 wheeled (flat bed with side rails)  Has the patient had two or more falls in the past year or any fall with injury in the past year?: Yes (falls getting in/out of bed > WC, 3 times in 6 months)  Receives Help From: Home health  ADL Assistance: Needs assistance (needs assist for showers, dresses and toilets independently)  Toileting: Independent (effortful, with difficulty)  Homemaking Assistance: Needs assistance (AL provides meals, aids assist with cleaning and laundry)  Ambulation Assistance: Independent  Transfer Assistance: Independent  Active : No  Additional Comments: pt claims she is having increased difficulty     Vision/Hearing  Vision  Vision: Impaired  Vision Exceptions: Wears glasses at all times  Hearing  Hearing: Within functional limits      Cognition   Orientation  Overall Orientation Status: Within Functional Limits  Cognition  Arousal/Alertness: Appropriate responses to stimuli  Attention Span: Difficulty attending to directions; Difficulty dividing attention; Attends with cues to redirect  Memory: Decreased recall of recent events  Safety Judgement: Decreased awareness of need for assistance;Decreased awareness of need for safety  Insights: Decreased awareness of deficits     Objective               Bed mobility  Supine to Sit: Moderate assistance (vc for encouragement/sequence; HOB up and use of rail)  Sit to Supine: Moderate assistance;2 Person assistance (for trunk and legs)  Transfers  Lateral Transfers: Contact guard assistance (pt able to perform lateral scoot (simulate slide board) x2 ft with max encouragement on EOB)  Comment: Pt declined transfer OOB due to malaise/interrupted meal time despite max encouragement. Balance  Sitting - Static: Good  Sitting - Dynamic: Fair  Comments: Pt sat EOB x10 mins with SBA.               AM-PAC Score  AM-PAC Inpatient Mobility Raw Score : 11 (06/23/22 1450)  AM-PAC Inpatient T-Scale Score : 33.86 (06/23/22 1450)  Mobility Inpatient CMS 0-100% Score: 72.57 (06/23/22 1450)  Mobility Inpatient CMS G-Code Modifier : CL (06/23/22 1450)          Goals  Short Term Goals  Time Frame for Short term goals: upon discharge  Short term goal 1: Pt will complete bed mobility with CGA x 1   ongoing  Short term goal 2: Pt will complete stand pivot transfer with CGA x 1   ongoing  Short term goal 3: Pt will demonstrate fair + dynamic sitting   ongoing  Patient Goals   Patient goals : to return home       Education  Patient Education  Education Given To: Patient  Education Provided: Role of Therapy;Plan of Care;Transfer Training  Education Method: Verbal  Education Outcome: Verbalized understanding;Continued education needed      Therapy Time   Individual Concurrent Group Co-treatment   Time In 1315         Time Out 1340         Minutes 25           Timed Code Treatment Minutes:  25    Total Treatment Minutes:  [address], PT

## 2022-06-24 LAB
ANION GAP SERPL CALCULATED.3IONS-SCNC: 9 MMOL/L (ref 3–16)
BUN BLDV-MCNC: 23 MG/DL (ref 7–20)
CALCIUM SERPL-MCNC: 8.9 MG/DL (ref 8.3–10.6)
CHLORIDE BLD-SCNC: 98 MMOL/L (ref 99–110)
CO2: 30 MMOL/L (ref 21–32)
CREAT SERPL-MCNC: 0.7 MG/DL (ref 0.6–1.2)
GFR AFRICAN AMERICAN: >60
GFR NON-AFRICAN AMERICAN: >60
GLUCOSE BLD-MCNC: 145 MG/DL (ref 70–99)
GLUCOSE BLD-MCNC: 176 MG/DL (ref 70–99)
GLUCOSE BLD-MCNC: 176 MG/DL (ref 70–99)
GLUCOSE BLD-MCNC: 285 MG/DL (ref 70–99)
GLUCOSE BLD-MCNC: 287 MG/DL (ref 70–99)
PERFORMED ON: ABNORMAL
POTASSIUM REFLEX MAGNESIUM: 4 MMOL/L (ref 3.5–5.1)
SODIUM BLD-SCNC: 137 MMOL/L (ref 136–145)

## 2022-06-24 PROCEDURE — 2700000000 HC OXYGEN THERAPY PER DAY

## 2022-06-24 PROCEDURE — 6370000000 HC RX 637 (ALT 250 FOR IP): Performed by: INTERNAL MEDICINE

## 2022-06-24 PROCEDURE — 36415 COLL VENOUS BLD VENIPUNCTURE: CPT

## 2022-06-24 PROCEDURE — 94761 N-INVAS EAR/PLS OXIMETRY MLT: CPT

## 2022-06-24 PROCEDURE — 6360000002 HC RX W HCPCS: Performed by: INTERNAL MEDICINE

## 2022-06-24 PROCEDURE — 2580000003 HC RX 258: Performed by: INTERNAL MEDICINE

## 2022-06-24 PROCEDURE — 80048 BASIC METABOLIC PNL TOTAL CA: CPT

## 2022-06-24 PROCEDURE — 1200000000 HC SEMI PRIVATE

## 2022-06-24 RX ORDER — CEFDINIR 300 MG/1
300 CAPSULE ORAL EVERY 12 HOURS SCHEDULED
Status: DISCONTINUED | OUTPATIENT
Start: 2022-06-24 | End: 2022-06-25 | Stop reason: HOSPADM

## 2022-06-24 RX ADMIN — LEVOTHYROXINE SODIUM 100 MCG: 0.1 TABLET ORAL at 05:30

## 2022-06-24 RX ADMIN — LATANOPROST 1 DROP: 50 SOLUTION OPHTHALMIC at 20:53

## 2022-06-24 RX ADMIN — ASPIRIN 81 MG: 81 TABLET, CHEWABLE ORAL at 08:01

## 2022-06-24 RX ADMIN — SODIUM CHLORIDE, PRESERVATIVE FREE 10 ML: 5 INJECTION INTRAVENOUS at 20:53

## 2022-06-24 RX ADMIN — SODIUM CHLORIDE, PRESERVATIVE FREE 10 ML: 5 INJECTION INTRAVENOUS at 11:39

## 2022-06-24 RX ADMIN — LISINOPRIL 5 MG: 5 TABLET ORAL at 20:50

## 2022-06-24 RX ADMIN — INSULIN LISPRO 1 UNITS: 100 INJECTION, SOLUTION INTRAVENOUS; SUBCUTANEOUS at 14:05

## 2022-06-24 RX ADMIN — METHOCARBAMOL 750 MG: 750 TABLET ORAL at 18:35

## 2022-06-24 RX ADMIN — METHOCARBAMOL 750 MG: 750 TABLET ORAL at 20:50

## 2022-06-24 RX ADMIN — CEFDINIR 300 MG: 300 CAPSULE ORAL at 20:51

## 2022-06-24 RX ADMIN — ENOXAPARIN SODIUM 40 MG: 100 INJECTION SUBCUTANEOUS at 08:01

## 2022-06-24 RX ADMIN — Medication 5 MG: at 20:51

## 2022-06-24 RX ADMIN — INSULIN GLARGINE 10 UNITS: 100 INJECTION, SOLUTION SUBCUTANEOUS at 21:00

## 2022-06-24 RX ADMIN — INSULIN LISPRO 2 UNITS: 100 INJECTION, SOLUTION INTRAVENOUS; SUBCUTANEOUS at 20:59

## 2022-06-24 RX ADMIN — METHOCARBAMOL 750 MG: 750 TABLET ORAL at 08:01

## 2022-06-24 RX ADMIN — INSULIN LISPRO 1 UNITS: 100 INJECTION, SOLUTION INTRAVENOUS; SUBCUTANEOUS at 18:35

## 2022-06-24 RX ADMIN — LISINOPRIL 5 MG: 5 TABLET ORAL at 08:01

## 2022-06-24 RX ADMIN — ATORVASTATIN CALCIUM 20 MG: 20 TABLET, FILM COATED ORAL at 08:01

## 2022-06-24 RX ADMIN — TIMOLOL MALEATE 1 DROP: 5 SOLUTION OPHTHALMIC at 08:02

## 2022-06-24 RX ADMIN — INSULIN LISPRO 1 UNITS: 100 INJECTION, SOLUTION INTRAVENOUS; SUBCUTANEOUS at 08:26

## 2022-06-24 ASSESSMENT — PAIN DESCRIPTION - PAIN TYPE: TYPE: ACUTE PAIN

## 2022-06-24 ASSESSMENT — PAIN DESCRIPTION - ORIENTATION: ORIENTATION: RIGHT

## 2022-06-24 ASSESSMENT — PAIN - FUNCTIONAL ASSESSMENT: PAIN_FUNCTIONAL_ASSESSMENT: ACTIVITIES ARE NOT PREVENTED

## 2022-06-24 ASSESSMENT — PAIN SCALES - GENERAL
PAINLEVEL_OUTOF10: 0
PAINLEVEL_OUTOF10: 0
PAINLEVEL_OUTOF10: 5
PAINLEVEL_OUTOF10: 0
PAINLEVEL_OUTOF10: 0

## 2022-06-24 ASSESSMENT — PAIN DESCRIPTION - ONSET: ONSET: ON-GOING

## 2022-06-24 ASSESSMENT — PAIN DESCRIPTION - LOCATION: LOCATION: HIP

## 2022-06-24 ASSESSMENT — PAIN DESCRIPTION - DESCRIPTORS: DESCRIPTORS: ACHING

## 2022-06-24 ASSESSMENT — PAIN DESCRIPTION - FREQUENCY: FREQUENCY: INTERMITTENT

## 2022-06-24 NOTE — PLAN OF CARE
Problem: Safety - Adult  Goal: Free from fall injury  6/24/2022 1338 by María Morrissey RN  Outcome: Progressing  Flowsheets (Taken 6/24/2022 0800)  Free From Fall Injury: Instruct family/caregiver on patient safety     Problem: Skin/Tissue Integrity  Goal: Absence of new skin breakdown  Description: 1. Monitor for areas of redness and/or skin breakdown  2. Assess vascular access sites hourly  3. Every 4-6 hours minimum:  Change oxygen saturation probe site  4. Every 4-6 hours:  If on nasal continuous positive airway pressure, respiratory therapy assess nares and determine need for appliance change or resting period.   6/24/2022 1338 by María Morrissey RN  Outcome: Progressing     Problem: ABCDS Injury Assessment  Goal: Absence of physical injury  Outcome: Progressing  Flowsheets (Taken 6/24/2022 0800)  Absence of Physical Injury: Implement safety measures based on patient assessment

## 2022-06-24 NOTE — PROGRESS NOTES
HOSPITAL MEDICINE  - PROGRESS NOTE    Admit Date: 6/18/2022         Interval History:   64 y.o. female,hx of prior CVA following LEONILA in 2019 with residual rt sided hemiparesis, breast Ca s/p treatment, DM2,HTN,HLD , chr back pain who presented after a fall     Alejandraorestes Gibson at her [facility]-3rd fall in a few days. Says she has been shaky and is \"falling apart\" . She has left leg paresis attributed to previous stroke, and is generally WC bound. Appears had CVA in 2019 after hip surgery. Was about to transfer into her wheelchair when she felt shaky and fell hitting her head, left arm. 3rd fall recently. In the ED,she was 86% on room air and was placed on 3 L   No dysuria but has had frequency     Her urine was nitrite positive with no pyuria -she reports freq and chr UTI     - CXR showed 6/18 possible infiltrate vs atelectasis. She was started on empiric antibx for Pneumonia         Subjective:   Feels falls sec to generalized weakness; states has had worsening in her weakness  Reports some pain from left shoulder from where she was \"picked up\" after her fall    Pxt is talkative; feels improved    No leg edema. Says she had some prior to admission. Pt reports no new complaints  Doing well    No fever or chills    No chest pain    No dyspnea    No cough        Objective: afebrile      Diet: ADULT DIET;  Regular; 5 carb choices (75 gm/meal)       Data:   Scheduled Meds: Reviewed  Continuous Infusions:   sodium chloride 5 mL/hr at 06/21/22 1511    dextrose         Intake/Output Summary (Last 24 hours) at 6/24/2022 1359  Last data filed at 6/24/2022 1004  Gross per 24 hour   Intake --   Output 600 ml   Net -600 ml     CBC:   Recent Labs     06/23/22  0452   WBC 6.3   HGB 13.2        BMP:  Recent Labs     06/24/22  1050      K 4.0   CL 98*   CO2 30   BUN 23*   CREATININE 0.7   GLUCOSE 285*          Objective:   Vitals: /70 Pulse 76   Temp 98.6 °F (37 °C) (Oral)   Resp 16   Ht 5' 4\" (1.626 m)   Wt 170 lb 6.7 oz (77.3 kg)   LMP  (LMP Unknown)   SpO2 94%   BMI 29.25 kg/m²   General appearance: alert, appears stated age and cooperative  Skin: Skin color, texture, turgor normal.   HEENT: Head: Normocephalic, no lesions, without obvious abnormality. Neck: SUPPLE  Lungs: clear to auscultation bilaterally  Heart: regular rate and rhythm, S1, S2 normal, no murmur, click, rub or gallop  Abdomen: soft, non-tender; bowel sounds normal; no masses,  no organomegaly  Extremities: extremities normal, atraumatic, no cyanosis or edema   CNS: awake, alert. Right leg power: 1+. Appears full power other limbs. Neurologic: Mental status: Alert, oriented, thought content appropriate      Echo:    Summary   Left ventricular cavity size is normal. There is mild concentric left   ventricular hypertrophy. Overall left ventricular systolic function appears   normal with an ejection fraction of 55-60%. No regional wall motion   abnormalities are noted. Indeterminate diastolic function with normal LVEDP. Assessment & Plan:     64 y.o. female with recurrent falls       Acute Hypoxic Respiratory failure: POA  - O2 86-87 % on RA in ED  - Required up to 5L Oxygen  - CT chest: No CT evidence of pulmonary embolism. Patchy peripheral airspace density, with areas of consolidation in the bilateral lower  lobes, particularly in the left base. - She is on antibx for UTI, possible pneumonia. - ; but she is obese. - Pxt has limited mobility baseline, uses WC for amb- Encouraged incentive spirometry  - Cont. O2 supplement and wean as tolerated  - Treating nasal congestion with saline nasal spray.    - Cont to wean O2 as able      Pulmonary airspace disease  Probable atelectasis; Possible mild CHF with preserved EF: POA  - Pxt was hypoxic on presentation, had LE edema  - CXR: Patchy peripheral airspace density, with areas of consolidation in the bilateral lower lobes, particularly in the left base. - Was started on antibx for possible pneumonia. Procalcitonin: 0.06.   - Cx: NGTD  - COVID: negative  - Overall, seems more likely atelectasis but cannot completely r/o mild CHF. Improved with some lasix, and incentive spirometry  - Pneumonia appears less likely, and considered ruled out at this time  - Has diuresed well, and appears euvolemic at this time. Likely will not nned to continue lasix, but muct monitor fluid status as o/p   - Continue antibx for UTI to complete course  - Encourage incentive spirometry- as above      Complicated E. coli UTI- POA   - UTI with generalized weakness and recurrent falls  - Tolerating rocephin: Will favor treating with Cephalosporins at least 10 days total, IV---> PO  - She has recurrent UTIs: states she has seen a urologist in the past.   - Check bladder scans      Fall; recurrent falls  - Likely multifactorial:-sec to generalized debility, complicated UTI, with background gen weakness, hx of CVA;  Right LE weakness, hx of spinal stenosis  -CT head and cervical spine: nil acute  - Neg orthostatic vitals  - Treat UTI  - PT/OT eval: SNF  - Pxt has limited mobility baseline, uses WC for ambulation        Bilateral LE spasms  -Says has resolved with muscle relaxants        DM2, controlled  Essential hypertension, benign  On HCTZ - lisinopril  Bps are soft  Will hold HCTZ for now, and decrease lisinopril dose       Spinal stenosis  - Ct home meds  - PT/OT      Hypothyroidism  - On synthroid  TSH normal        Hx of CVA with residual Right leg weakness  Appears right sided weakness, but mostly RLE   Neg CT head  Pt wheel chair bound but able to transfer  PT/OT       Chronic nonalcoholic liver disease  Baseline          Disposition:  PT/OT recs SNF, referal to [facility]  Medically ready for DC with Devang Sutton MD

## 2022-06-24 NOTE — CARE COORDINATION
CM following for discharge planning. [facility] precert started for admission to [facility], [facility]. When precert obtained, pt will need transport and a rapid COVID test as well as a dictated discharge summary. Per Kari at [facility], they can only take patient on 2nd  shift today, tomorrow or Sunday, transport will need to be set up for 7:00pm or after.   HENS completed Document ID : [id number]    Raymond Mcdaniels RN, BSN, [address]  Case Management Department  [phone]

## 2022-06-24 NOTE — PLAN OF CARE
Problem: Discharge Planning  Goal: Discharge to home or other facility with appropriate resources  Outcome: Adequate for Discharge     Problem: Pain  Goal: Verbalizes/displays adequate comfort level or baseline comfort level  Outcome: Adequate for Discharge     Problem: Safety - Adult  Goal: Free from fall injury  Outcome: Adequate for Discharge     Problem: Skin/Tissue Integrity  Goal: Absence of new skin breakdown  Description: 1. Monitor for areas of redness and/or skin breakdown  2. Assess vascular access sites hourly  3. Every 4-6 hours minimum:  Change oxygen saturation probe site  4. Every 4-6 hours:  If on nasal continuous positive airway pressure, respiratory therapy assess nares and determine need for appliance change or resting period.   Outcome: Adequate for Discharge     Problem: Respiratory - Adult  Goal: Achieves optimal ventilation and oxygenation  Outcome: Adequate for Discharge     Problem: Chronic Conditions and Co-morbidities  Goal: Patient's chronic conditions and co-morbidity symptoms are monitored and maintained or improved  Outcome: Adequate for Discharge

## 2022-06-25 VITALS
TEMPERATURE: 98.4 F | RESPIRATION RATE: 18 BRPM | HEIGHT: 64 IN | OXYGEN SATURATION: 94 % | WEIGHT: 167.77 LBS | DIASTOLIC BLOOD PRESSURE: 75 MMHG | HEART RATE: 81 BPM | BODY MASS INDEX: 28.64 KG/M2 | SYSTOLIC BLOOD PRESSURE: 129 MMHG

## 2022-06-25 LAB
GLUCOSE BLD-MCNC: 162 MG/DL (ref 70–99)
GLUCOSE BLD-MCNC: 176 MG/DL (ref 70–99)
GLUCOSE BLD-MCNC: 178 MG/DL (ref 70–99)
PERFORMED ON: ABNORMAL
SARS-COV-2, NAAT: NOT DETECTED

## 2022-06-25 PROCEDURE — 6360000002 HC RX W HCPCS: Performed by: INTERNAL MEDICINE

## 2022-06-25 PROCEDURE — 6370000000 HC RX 637 (ALT 250 FOR IP): Performed by: INTERNAL MEDICINE

## 2022-06-25 PROCEDURE — 87635 SARS-COV-2 COVID-19 AMP PRB: CPT

## 2022-06-25 PROCEDURE — 2580000003 HC RX 258: Performed by: INTERNAL MEDICINE

## 2022-06-25 RX ADMIN — CEFDINIR 300 MG: 300 CAPSULE ORAL at 08:20

## 2022-06-25 RX ADMIN — INSULIN LISPRO 1 UNITS: 100 INJECTION, SOLUTION INTRAVENOUS; SUBCUTANEOUS at 11:52

## 2022-06-25 RX ADMIN — INSULIN LISPRO 1 UNITS: 100 INJECTION, SOLUTION INTRAVENOUS; SUBCUTANEOUS at 08:26

## 2022-06-25 RX ADMIN — METHOCARBAMOL 750 MG: 750 TABLET ORAL at 08:20

## 2022-06-25 RX ADMIN — METHOCARBAMOL 750 MG: 750 TABLET ORAL at 11:55

## 2022-06-25 RX ADMIN — ATORVASTATIN CALCIUM 20 MG: 20 TABLET, FILM COATED ORAL at 08:20

## 2022-06-25 RX ADMIN — INSULIN LISPRO 1 UNITS: 100 INJECTION, SOLUTION INTRAVENOUS; SUBCUTANEOUS at 17:57

## 2022-06-25 RX ADMIN — METHOCARBAMOL 750 MG: 750 TABLET ORAL at 17:59

## 2022-06-25 RX ADMIN — ASPIRIN 81 MG: 81 TABLET, CHEWABLE ORAL at 08:20

## 2022-06-25 RX ADMIN — TIMOLOL MALEATE 1 DROP: 5 SOLUTION OPHTHALMIC at 08:23

## 2022-06-25 RX ADMIN — SODIUM CHLORIDE, PRESERVATIVE FREE 10 ML: 5 INJECTION INTRAVENOUS at 08:24

## 2022-06-25 RX ADMIN — LEVOTHYROXINE SODIUM 100 MCG: 0.1 TABLET ORAL at 06:57

## 2022-06-25 RX ADMIN — ENOXAPARIN SODIUM 40 MG: 100 INJECTION SUBCUTANEOUS at 08:20

## 2022-06-25 ASSESSMENT — PAIN SCALES - GENERAL: PAINLEVEL_OUTOF10: 0

## 2022-06-25 NOTE — PLAN OF CARE
Problem: Respiratory - Adult  Goal: Achieves optimal ventilation and oxygenation  Outcome: Progressing  Flowsheets (Taken 6/25/2022 0223)  Achieves optimal ventilation and oxygenation: Assess for changes in respiratory status  Note: Pt's SPO2 is 92% on 2L O2 via nasal cannula. Will continue to monitor. Problem: Safety - Adult  Goal: Free from fall injury  6/25/2022 0224 by Francheska Perez RN  Outcome: Progressing  Flowsheets (Taken 6/25/2022 0224)  Free From Fall Injury: Instruct family/caregiver on patient safety  Note: Fall precautions are in place. Bed alarm is on and in lowest position. Pt is using call light appropriately. Will continue to monitor.

## 2022-06-25 NOTE — DISCHARGE INSTR - COC
Continuity of Care Form    Patient Name: Minal Dupont   :  1945  MRN:  [id number]    Admit date:  2022  Discharge date:  22    Code Status Order: DNR-CCA   Advance Directives:      Admitting Physician:  Irvin Davis MD  PCP: Berenice Angulo MD    Discharging Nurse: [facility] Unit/Room#: 2245/7404-19  Discharging Unit Phone Number: [phone]    Emergency Contact:   Extended Emergency Contact Information  Primary Emergency Contact: Nely Marquez  Address: [address], [facility] Sulaiman Burkett of [address] [facility] Phone: [phone]  Mobile Phone: [phone]  Relation: Child  Preferred language: English  Secondary Emergency Contact: [address] of [address] [facility] Phone: [phone]  Relation: Child    Past Surgical History:  Past Surgical History:   Procedure Laterality Date    APPENDECTOMY  14 years ago    BREAST BIOPSY      left    BREAST SURGERY      bilateral mastectomy    COLONOSCOPY  2011    DILATION AND CURETTAGE OF UTERUS  1975    JOINT REPLACEMENT Right     hip    MASTECTOMY, BILATERAL      OTHER SURGICAL HISTORY  16    excision left lower abdominal wall mass, excision right lower leg mass    TONSILLECTOMY  5 years ago       Immunization History:   Immunization History   Administered Date(s) Administered    COVID-19, [employer] top, DO NOT Dilute, Earl-Sucrose, 12+ yrs, PF, 30 mcg/0.3 mL dose 2022    COVID-19, Pfizer Purple top, DILUTE for use, 12+ yrs, 30mcg/0.3mL dose 2021, 2021, 10/22/2021    DTaP 2008    Influenza Virus Vaccine 2012    Influenza Whole 2010, 2011    Pneumococcal Conjugate 7-valent (Prevnar7) 2008    Pneumococcal Polysaccharide (Efgswstzz19) 2000    Tdap (Boostrix, Adacel) 2008    Zoster Live (Zostavax) 2010       Active Problems:  Patient Active Problem List   Diagnosis Code    Essential hypertension, benign I10 Adenocarcinoma, breast ([facility] [address] C50.919    Spinal stenosis M48.00    Fibromyalgia M79.7    Osteopenia M85.80    Bilateral leg edema R60.0    Polycythemia D75.1    Chronic nonalcoholic liver disease C51.0    DM (diabetes mellitus) (HCC) E11.9    Dry eyes H04.123    Right hip pain M25.551    Hyperlipidemia E78.5    Skin lesions L98.9    Skin lesion L98.9    Skin lesion of right leg L98.9    Acute respiratory failure with hypoxia (HCC) J96.01       Isolation/Infection:   Isolation            No Isolation          Patient Infection Status       Infection Onset Added Last Indicated Last Indicated By Review Planned Expiration Resolved Resolved By    None active    Resolved    COVID-19 (Rule Out) 06/18/22 06/18/22 06/18/22 COVID-19 & Influenza Combo (Ordered)   06/18/22 Rule-Out Test Resulted            Nurse Assessment:  Last Vital Signs: /69   Pulse 73   Temp 98.6 °F (37 °C) (Oral)   Resp 18   Ht 5' 4\" (1.626 m)   Wt 167 lb 12.3 oz (76.1 kg)   LMP  (LMP Unknown)   SpO2 93%   BMI 28.80 kg/m²     Last documented pain score (0-10 scale): Pain Level: 0  Last Weight:   Wt Readings from Last 1 Encounters:   06/25/22 167 lb 12.3 oz (76.1 kg)     Mental Status:  oriented    IV Access:  - None    Nursing Mobility/ADLs:  Walking   Dependent  Transfer  Dependent  Bathing  Dependent  Dressing  Dependent  Toileting  Dependent  Feeding  Independent  Med Admin  Assisted  Med Delivery   whole    Wound Care Documentation and Therapy:  Incision 04/04/16 Abdomen Left; Lower (Active)   Number of days: 2273       Incision 04/04/16 Leg Right (Active)   Number of days: 2273        Elimination:  Continence: Bowel: Yes and has had a few incontinent episodes.    Bladder: Yes  Urinary Catheter: None   Colostomy/Ileostomy/Ileal Conduit: No       Date of Last BM: 06/25/22      Intake/Output Summary (Last 24 hours) at 6/25/2022 1152  Last data filed at 6/25/2022 0656  Gross per 24 hour   Intake 370 ml   Output 0 ml   Net 370 ml     I/O last 3 completed shifts: In: 370 [P.O.:360; I.V.:10]  Out: 600 [Urine:600]    Safety Concerns:     History of Falls (last 30 days) and At Risk for Falls    Impairments/Disabilities:      None    Nutrition Therapy:  Current Nutrition Therapy:   - Oral Diet:  General and Carb Control 5 carbs/meal (2000kcals/day)    Routes of Feeding: Oral  Liquids: Thin Liquids  Daily Fluid Restriction: no  Last Modified Barium Swallow with Video (Video Swallowing Test): not done    Treatments at the Time of Hospital Discharge:   Respiratory Treatments: none  Oxygen Therapy:  is on oxygen at 2 L/min per nasal cannula. Ventilator:    - No ventilator support    Rehab Therapies: Physical Therapy and Occupational Therapy  Weight Bearing Status/Restrictions: No weight bearing restrictions  Other Medical Equipment (for information only, NOT a DME order):  walker, bath bench, and hospital bed  Other Treatments: ***    Patient's personal belongings (please select all that are sent with patient):  Glasses, shirt, pants, shoes    RN SIGNATURE:  {Esignature:[id number]}    CASE MANAGEMENT/SOCIAL WORK SECTION    Inpatient Status Date: 6/25/2022    Readmission Risk Assessment Score:  Readmission Risk              Risk of Unplanned Readmission:  17           Discharging to Facility/ [address] [place] [address] for [address] [place] [address]  Address: [facility], [address] [place] [address] Street  Phone: [phone]    / signature: Electronically signed by Ale Elam RN on 6/25/22 at 11:52 AM EDT    PHYSICIAN SECTION    Prognosis: Fair    Condition at Discharge: Stable    Rehab Potential (if transferring to Rehab):  Fair    Recommended Labs or Other Treatments After Discharge: CBC and BMP 1 week after discharge    Physician Certification: I certify the above information and transfer of Mariano Arceo  is necessary for the continuing treatment of the diagnosis listed and that she requires Wil Wheeler for greater 30

## 2022-06-25 NOTE — PLAN OF CARE
Problem: Pain  Goal: Verbalizes/displays adequate comfort level or baseline comfort level  Outcome: Progressing     Problem: Safety - Adult  Goal: Free from fall injury  6/25/2022 1454 by Livan Wright RN  Outcome: Progressing     Problem: Skin/Tissue Integrity  Goal: Absence of new skin breakdown  Description: 1. Monitor for areas of redness and/or skin breakdown  2. Assess vascular access sites hourly  3. Every 4-6 hours minimum:  Change oxygen saturation probe site  4. Every 4-6 hours:  If on nasal continuous positive airway pressure, respiratory therapy assess nares and determine need for appliance change or resting period.   Outcome: Progressing     Problem: Respiratory - Adult  Goal: Achieves optimal ventilation and oxygenation  6/25/2022 1454 by Livan Wright RN  Outcome: Progressing     Problem: ABCDS Injury Assessment  Goal: Absence of physical injury  Outcome: Progressing

## 2022-06-25 NOTE — PROGRESS NOTES
HOSPITAL MEDICINE  - PROGRESS NOTE    Admit Date: 6/18/2022         Interval History:   64 y.o. 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 Says she has been shaky and is \"falling apart\" . She has left leg paresis attributed to previous stroke, and is generally WC bound. Appears had CVA in 2019 after hip surgery. Was about to transfer into her wheelchair when she felt shaky and fell hitting her head, left arm. 3rd fall recently. In the ED,she was 86% on room air and was placed on 3 L   No dysuria but has had frequency     Her urine was nitrite positive with no pyuria -she reports freq and chr UTI     - CXR showed 6/18 possible infiltrate vs atelectasis. She was started on empiric antibx for Pneumonia         Subjective:   Feels falls sec to generalized weakness; states has had worsening in her weakness  Reports some pain from left shoulder from where she was \"picked up\" after her fall    Pxt is talkative; feels improved    No leg edema. Says she had some prior to admission. Pt reports no new complaints  Doing well    No fever or chills    No chest pain    No dyspnea    No cough        Objective: afebrile      Diet: ADULT DIET;  Regular; 5 carb choices (75 gm/meal)       Data:   Scheduled Meds: Reviewed  Continuous Infusions:   sodium chloride 5 mL/hr at 06/21/22 1511    dextrose         Intake/Output Summary (Last 24 hours) at 6/25/2022 1221  Last data filed at 6/25/2022 0656  Gross per 24 hour   Intake 370 ml   Output 0 ml   Net 370 ml     CBC:   Recent Labs     06/23/22  0452   WBC 6.3   HGB 13.2        BMP:  Recent Labs     06/24/22  1050      K 4.0   CL 98*   CO2 30   BUN 23*   CREATININE 0.7   GLUCOSE 285*          Objective:   Vitals: /69 Pulse 73   Temp 98.6 °F (37 °C) (Oral)   Resp 18   Ht 5' 4\" (1.626 m)   Wt 167 lb 12.3 oz (76.1 kg)   LMP  (LMP Unknown)   SpO2 93%   BMI 28.80 kg/m²   General appearance: alert, appears stated age and cooperative  Skin: Skin color, texture, turgor normal.   HEENT: Head: Normocephalic, no lesions, without obvious abnormality. Neck: SUPPLE  Lungs: clear to auscultation bilaterally  Heart: regular rate and rhythm, S1, S2 normal, no murmur, click, rub or gallop  Abdomen: soft, non-tender; bowel sounds normal; no masses,  no organomegaly  Extremities: extremities normal, atraumatic, no cyanosis or edema   CNS: awake, alert. Right leg power: 1+. Appears full power other limbs. Neurologic: Mental status: Alert, oriented, thought content appropriate      Echo:    Summary   Left ventricular cavity size is normal. There is mild concentric left   ventricular hypertrophy. Overall left ventricular systolic function appears   normal with an ejection fraction of 55-60%. No regional wall motion   abnormalities are noted. Indeterminate diastolic function with normal LVEDP. Assessment & Plan:     64 y.o. female with recurrent falls       Acute Hypoxic Respiratory failure: POA  - O2 86-87 % on RA in ED  - Required up to 5L Oxygen  - CT chest: No CT evidence of pulmonary embolism. Patchy peripheral airspace density, with areas of consolidation in the bilateral lower  lobes, particularly in the left base. - She is on antibx for UTI, possible pneumonia. - ; but she is obese. - Pxt has limited mobility baseline, uses WC for amb- Encouraged incentive spirometry  - Cont. O2 supplement and wean as tolerated  - Treating nasal congestion with saline nasal spray.    - Cont to wean O2 as able      Pulmonary airspace disease  Probable atelectasis; Possible mild CHF with preserved EF: POA  - Pxt was hypoxic on presentation, had LE edema  - CXR: Patchy peripheral airspace density, with areas of consolidation in the bilateral lower lobes, particularly in the left base. - Was started on antibx for possible pneumonia. Procalcitonin: 0.06.   - Cx: NGTD  - COVID: negative  - Overall, seems more likely atelectasis but cannot completely r/o mild CHF. Improved with some lasix, and incentive spirometry  - Pneumonia appears less likely, and considered ruled out at this time  - Has diuresed well, and appears euvolemic at this time. Likely will not nned to continue lasix, but muct monitor fluid status as o/p   - Continue antibx for UTI to complete course  - Encourage incentive spirometry- as above      Complicated E. coli UTI- POA   - UTI with generalized weakness and recurrent falls  - Tolerating rocephin: Will favor treating with Cephalosporins at least 10 days total, IV---> PO  - She has recurrent UTIs: states she has seen a urologist in the past.   - Check bladder scans      Fall; recurrent falls  - Likely multifactorial:-sec to generalized debility, complicated UTI, with background gen weakness, hx of CVA;  Right LE weakness, hx of spinal stenosis  -CT head and cervical spine: nil acute  - Neg orthostatic vitals  - Treat UTI  - PT/OT eval: SNF  - Pxt has limited mobility baseline, uses WC for ambulation        Bilateral LE spasms  -Says has resolved with muscle relaxants        DM2, controlled  Essential hypertension, benign  On HCTZ - lisinopril  Bps are soft  Will hold HCTZ for now, and decrease lisinopril dose       Spinal stenosis  - Ct home meds  - PT/OT      Hypothyroidism  - On synthroid  TSH normal        Hx of CVA with residual Right leg weakness  Appears right sided weakness, but mostly RLE   Neg CT head  Pt wheel chair bound but able to transfer  PT/OT       Chronic nonalcoholic liver disease  Baseline          Disposition:  PT/OT recs SNF, referal to [facility]  Medically ready for DC with Abdullahi Hooper MD

## 2022-06-25 NOTE — DISCHARGE INSTR - DIET

## 2022-06-25 NOTE — CARE COORDINATION
Case Management Assessment            Discharge Note                    Date / Time of Note: 6/25/2022 8:42 AM                  Discharge Note Completed by: Katt Lira RN     Ms. Diggs Resides will discharge to [facility] for [facility] today at [address] [facility]. Pt's family is aware and in agreement with this discharge plan. RN CALL REPORT: [phone]  [facility] -[phone]    Patient Name: Lavell Patton   YOB: 1945  Diagnosis: Hypoxemia [R09.02]  Acute respiratory failure with hypoxia ([address] [J96.01]  Community acquired pneumonia, unspecified laterality [J18.9]   Date / Time: 6/18/2022  4:03 AM    Current PCP: Jomar Gupta MD    Advance Directives:  Code Status: DNR-CCA    Financial:  Payor: [employer] / Plan: Kala [employer]irmer PLUS HMO / Product Type: *No Product type* /      Pharmacy:    [facility] ([facility] [facility])  Amanda Garcia [phone] - F [phone]  [address] [address]  Phone: [phone] Fax: [phone]    ADLS:  Current PT AM-PAC Score: 11 /24  Current OT AM-PAC Score: 14 /24    DISCHARGE Disposition:    [facility] for Post Acute Care  Address: [address]  Phone: [phone]    LOC at discharge: Skilled  [address] Completed: Yes    Notification completed in HENS/PAS?:  Yes : CM has completed HENS online through secure website for SNF admission at [facility] for [facility]. Document ID #: [id number]    IMM Completed:   Yes, Case management has presented and reviewed IMM letter #2 to the patient and/or family/ POA. Patient and/or family/POA verbalized understanding of their medicare rights and appeal process if needed. Patient and/or family/POA has signed, initialed and placed today's date (6/25/2022) and time (4725) on IMM letter #2 on the the appropriate lines.  Patient and/or family/POA, copy of letter offered and

## 2022-06-29 NOTE — PROGRESS NOTES
Physician Progress Note      Soumya Dumont  [facility] #:                  [id number]  :                       1945  ADMIT DATE:       2022 4:03 AM  DISCH DATE:        2022 8:32 PM  RESPONDING  PROVIDER #:        Leana Lamas MD          QUERY TEXT:    Patient admitted  with atelectasis, presumed pneumonia, and UTI. Noted   documentation of acute hypoxic respiratory failure POA in  through    per next attending Hospitalist PNs. In order to support the diagnosis of acute   respiratory failure, please include additional clinical indicators in your   documentation. Or please document if the diagnosis of acute respiratory   failure has been ruled out after further study. The medical record reflects the following:  Risk Factors: 64 y.o. female with atelectasis, dCHF, UTI, DM  Clinical Indicators: Per  H&P &  Hospitalist PN: Hypoxia - on 3L  Per   H&P No distress. Per  Hospitalist: Acute hypoxic resp failure POA O2   86-87% on RA in ED. Per ED: Hypoxemia. Treatment: supplemental oxygen: 3-5 L    Acute Respiratory Failure Clinical Indicators per [employer] [employer]-DR[employer] Training Guide and   Quick Reference Guide:  pO2 < 60 mmHg or SpO2 (pulse oximetry) < 91% breathing room air  pCO2 > 50 and pH < 7.35  P/F ratio (pO2 / FIO2) < 300  pO2 decrease or pCO2 increase by 10 mmHg from baseline (if known)  Supplemental oxygen of 40% or more  Presence of respiratory distress, tachypnea, dyspnea, shortness of breath,   wheezing  Unable to speak in complete sentences  Use of accessory muscles to breathe  Extreme anxiety and feeling of impending doom  Tripod position  Confusion/altered mental status/obtunded  Options provided:  -- Acute Respiratory Failure ruled out after study  -- Acute Respiratory Failure as evidenced by, Please document evidence.   -- Other - I will add my own diagnosis  -- Disagree - Not applicable / Not valid  -- Disagree - Clinically unable to determine / Unknown  -- Refer to Clinical Documentation Reviewer    PROVIDER RESPONSE TEXT:    ?Acute Hypoxic Respiratory failure: POA - O2 86-87 % on RA in ED - Placed on 3   Liters. Now on 5L, 92% - CT chest: No CT evidence of pulmonary embolism. Patchy peripheral airspace density, with areas of consolidation in the   bilateral lower ?lobes, particularly in the left base. - She is on antibx for   UTI, possible pneumonia. - ; but she is obese. - Pxt has limited   mobility baseline, uses WC for amb- Encouraged incentive spirometry - Cont. O2   supplement and wean as tolerated -will treat nasal congestion with saline   nasal spray, cont to try to wean O2    Query created by: Michaela Perry on 6/28/2022 12:36 PM      QUERY TEXT:    Pt admitted with atelectasis. Pt noted to have possible dCHF. If possible,   please document in progress notes and discharge summary the relationship, if   any, between atelectasis and dCHF. The medical record reflects the following:  Risk Factors: 64 y.o. female with weakness from previous CVA, chronic back   pain, frequent falls, atelectasis, possible chf (cannot be ruled out). Per   6/20 Hospitalist: Improved with some Lasix  Clinical Indicators:  Treatment: lasix 40mg x1 on 6/20, monitor  Options provided:  -- Atelectasis likely due to acute dCHF  -- Atelectasis unrelated to patient's dCHF  -- Other - I will add my own diagnosis  -- Disagree - Not applicable / Not valid  -- Disagree - Clinically unable to determine / Unknown  -- Refer to Clinical Documentation Reviewer    PROVIDER RESPONSE TEXT:    Atelectasis possibly due to decreased mobility sec to previous stroke and   pxt's body habitus.     Query created by: Michaela Perry on 6/28/2022 1:09 PM      Electronically signed by:  Dick Lynch MD 6/28/2022 8:25 PM

## 2023-03-27 ENCOUNTER — APPOINTMENT (OUTPATIENT)
Dept: CT IMAGING | Age: 78
DRG: 640 | End: 2023-03-27
Payer: MEDICARE

## 2023-03-27 ENCOUNTER — HOSPITAL ENCOUNTER (INPATIENT)
Age: 78
LOS: 3 days | Discharge: HOME | DRG: 640 | End: 2023-03-30
Attending: EMERGENCY MEDICINE | Admitting: STUDENT IN AN ORGANIZED HEALTH CARE EDUCATION/TRAINING PROGRAM
Payer: MEDICARE

## 2023-03-27 ENCOUNTER — APPOINTMENT (OUTPATIENT)
Dept: GENERAL RADIOLOGY | Age: 78
DRG: 640 | End: 2023-03-27
Payer: MEDICARE

## 2023-03-27 DIAGNOSIS — E83.42 HYPOMAGNESEMIA: ICD-10-CM

## 2023-03-27 DIAGNOSIS — R41.82 ALTERED MENTAL STATUS, UNSPECIFIED ALTERED MENTAL STATUS TYPE: Primary | ICD-10-CM

## 2023-03-27 DIAGNOSIS — E83.52 HYPERCALCEMIA: ICD-10-CM

## 2023-03-27 LAB
ALBUMIN SERPL-MCNC: 3.4 G/DL (ref 3.4–5)
ALBUMIN/GLOB SERPL: 1 {RATIO} (ref 1.1–2.2)
ALP SERPL-CCNC: 61 U/L (ref 40–129)
ALT SERPL-CCNC: 12 U/L (ref 10–40)
AMORPH SED URNS QL MICRO: ABNORMAL /HPF
ANION GAP SERPL CALCULATED.3IONS-SCNC: 8 MMOL/L (ref 3–16)
AST SERPL-CCNC: 16 U/L (ref 15–37)
BACTERIA URNS QL MICRO: ABNORMAL /HPF
BASE EXCESS BLDV CALC-SCNC: 14.7 MMOL/L (ref -2–3)
BASOPHILS # BLD: 0.1 K/UL (ref 0–0.2)
BASOPHILS NFR BLD: 0.7 %
BILIRUB SERPL-MCNC: 0.5 MG/DL (ref 0–1)
BILIRUB UR QL STRIP.AUTO: NEGATIVE
BUN SERPL-MCNC: 18 MG/DL (ref 7–20)
CALCIUM SERPL-MCNC: 12.9 MG/DL (ref 8.3–10.6)
CHLORIDE SERPL-SCNC: 87 MMOL/L (ref 99–110)
CLARITY UR: CLEAR
CO2 BLDV-SCNC: 45 MMOL/L
CO2 SERPL-SCNC: 38 MMOL/L (ref 21–32)
COHGB MFR BLDV: 1.3 % (ref 0–1.5)
COLOR UR: YELLOW
CREAT SERPL-MCNC: 0.5 MG/DL (ref 0.6–1.2)
DEPRECATED RDW RBC AUTO: 15 % (ref 12.4–15.4)
DO-HGB MFR BLDV: 11.2 %
EKG ATRIAL RATE: 90 BPM
EKG DIAGNOSIS: NORMAL
EKG P AXIS: 45 DEGREES
EKG P-R INTERVAL: 174 MS
EKG Q-T INTERVAL: 358 MS
EKG QRS DURATION: 94 MS
EKG QTC CALCULATION (BAZETT): 437 MS
EKG R AXIS: -15 DEGREES
EKG T AXIS: 68 DEGREES
EKG VENTRICULAR RATE: 90 BPM
EOSINOPHIL # BLD: 0.2 K/UL (ref 0–0.6)
EOSINOPHIL NFR BLD: 1.6 %
EPI CELLS #/AREA URNS HPF: ABNORMAL /HPF (ref 0–5)
GFR SERPLBLD CREATININE-BSD FMLA CKD-EPI: >60 ML/MIN/{1.73_M2}
GLUCOSE BLD-MCNC: 93 MG/DL (ref 70–99)
GLUCOSE SERPL-MCNC: 115 MG/DL (ref 70–99)
GLUCOSE UR STRIP.AUTO-MCNC: NEGATIVE MG/DL
HCO3 BLDV-SCNC: 42.4 MMOL/L (ref 24–28)
HCT VFR BLD AUTO: 38.1 % (ref 36–48)
HGB BLD-MCNC: 12.3 G/DL (ref 12–16)
HGB UR QL STRIP.AUTO: NEGATIVE
KETONES UR STRIP.AUTO-MCNC: NEGATIVE MG/DL
LACTATE BLDV-SCNC: 2.9 MMOL/L (ref 0.4–1.9)
LACTATE BLDV-SCNC: 3.6 MMOL/L (ref 0.4–1.9)
LEUKOCYTE ESTERASE UR QL STRIP.AUTO: ABNORMAL
LYMPHOCYTES # BLD: 1.7 K/UL (ref 1–5.1)
LYMPHOCYTES NFR BLD: 15.1 %
MAGNESIUM SERPL-MCNC: 1 MG/DL (ref 1.8–2.4)
MCH RBC QN AUTO: 25.6 PG (ref 26–34)
MCHC RBC AUTO-ENTMCNC: 32.2 G/DL (ref 31–36)
MCV RBC AUTO: 79.4 FL (ref 80–100)
METHGB MFR BLDV: 0.1 % (ref 0–1.5)
MONOCYTES # BLD: 0.8 K/UL (ref 0–1.3)
MONOCYTES NFR BLD: 7 %
NEUTROPHILS # BLD: 8.3 K/UL (ref 1.7–7.7)
NEUTROPHILS NFR BLD: 75.6 %
NITRITE UR QL STRIP.AUTO: POSITIVE
NT-PROBNP SERPL-MCNC: 61 PG/ML (ref 0–449)
PCO2 BLDV: 66.7 MMHG (ref 41–51)
PERFORMED ON: NORMAL
PH BLDV: 7.41 [PH] (ref 7.35–7.45)
PH UR STRIP.AUTO: 7.5 [PH] (ref 5–8)
PLATELET # BLD AUTO: 182 K/UL (ref 135–450)
PMV BLD AUTO: 7.1 FL (ref 5–10.5)
PO2 BLDV: 54.9 MMHG (ref 25–40)
POTASSIUM SERPL-SCNC: 3.5 MMOL/L (ref 3.5–5.1)
PROT SERPL-MCNC: 6.8 G/DL (ref 6.4–8.2)
PROT UR STRIP.AUTO-MCNC: NEGATIVE MG/DL
RBC # BLD AUTO: 4.8 M/UL (ref 4–5.2)
RBC #/AREA URNS HPF: ABNORMAL /HPF (ref 0–4)
SAO2 % BLDV: 89 %
SODIUM SERPL-SCNC: 133 MMOL/L (ref 136–145)
SP GR UR STRIP.AUTO: 1.01 (ref 1–1.03)
TROPONIN T SERPL-MCNC: <0.01 NG/ML
UA COMPLETE W REFLEX CULTURE PNL UR: YES
UA DIPSTICK W REFLEX MICRO PNL UR: YES
URN SPEC COLLECT METH UR: ABNORMAL
UROBILINOGEN UR STRIP-ACNC: 1 E.U./DL
WBC # BLD AUTO: 11 K/UL (ref 4–11)
WBC #/AREA URNS HPF: ABNORMAL /HPF (ref 0–5)

## 2023-03-27 PROCEDURE — 6360000002 HC RX W HCPCS

## 2023-03-27 PROCEDURE — 80053 COMPREHEN METABOLIC PANEL: CPT

## 2023-03-27 PROCEDURE — 1200000000 HC SEMI PRIVATE

## 2023-03-27 PROCEDURE — 83970 ASSAY OF PARATHORMONE: CPT

## 2023-03-27 PROCEDURE — 84484 ASSAY OF TROPONIN QUANT: CPT

## 2023-03-27 PROCEDURE — 36415 COLL VENOUS BLD VENIPUNCTURE: CPT

## 2023-03-27 PROCEDURE — 84145 PROCALCITONIN (PCT): CPT

## 2023-03-27 PROCEDURE — 82803 BLOOD GASES ANY COMBINATION: CPT

## 2023-03-27 PROCEDURE — 83605 ASSAY OF LACTIC ACID: CPT

## 2023-03-27 PROCEDURE — 87077 CULTURE AEROBIC IDENTIFY: CPT

## 2023-03-27 PROCEDURE — 6370000000 HC RX 637 (ALT 250 FOR IP)

## 2023-03-27 PROCEDURE — 2580000003 HC RX 258

## 2023-03-27 PROCEDURE — 81001 URINALYSIS AUTO W/SCOPE: CPT

## 2023-03-27 PROCEDURE — 96374 THER/PROPH/DIAG INJ IV PUSH: CPT

## 2023-03-27 PROCEDURE — 87186 SC STD MICRODIL/AGAR DIL: CPT

## 2023-03-27 PROCEDURE — 70450 CT HEAD/BRAIN W/O DYE: CPT

## 2023-03-27 PROCEDURE — 99285 EMERGENCY DEPT VISIT HI MDM: CPT

## 2023-03-27 PROCEDURE — 83735 ASSAY OF MAGNESIUM: CPT

## 2023-03-27 PROCEDURE — 82306 VITAMIN D 25 HYDROXY: CPT

## 2023-03-27 PROCEDURE — 83880 ASSAY OF NATRIURETIC PEPTIDE: CPT

## 2023-03-27 PROCEDURE — 93005 ELECTROCARDIOGRAM TRACING: CPT

## 2023-03-27 PROCEDURE — 71045 X-RAY EXAM CHEST 1 VIEW: CPT

## 2023-03-27 PROCEDURE — 85025 COMPLETE CBC W/AUTO DIFF WBC: CPT

## 2023-03-27 PROCEDURE — 87086 URINE CULTURE/COLONY COUNT: CPT

## 2023-03-27 RX ORDER — SODIUM CHLORIDE, SODIUM LACTATE, POTASSIUM CHLORIDE, AND CALCIUM CHLORIDE .6; .31; .03; .02 G/100ML; G/100ML; G/100ML; G/100ML
1000 INJECTION, SOLUTION INTRAVENOUS ONCE
Status: COMPLETED | OUTPATIENT
Start: 2023-03-27 | End: 2023-03-27

## 2023-03-27 RX ORDER — LISINOPRIL 10 MG/1
10 TABLET ORAL NIGHTLY
Status: DISCONTINUED | OUTPATIENT
Start: 2023-03-27 | End: 2023-03-30 | Stop reason: HOSPADM

## 2023-03-27 RX ORDER — SODIUM CHLORIDE 9 MG/ML
INJECTION, SOLUTION INTRAVENOUS CONTINUOUS
Status: DISCONTINUED | OUTPATIENT
Start: 2023-03-27 | End: 2023-03-29

## 2023-03-27 RX ORDER — LATANOPROST 50 UG/ML
1 SOLUTION/ DROPS OPHTHALMIC NIGHTLY
Status: DISCONTINUED | OUTPATIENT
Start: 2023-03-27 | End: 2023-03-30 | Stop reason: HOSPADM

## 2023-03-27 RX ORDER — LANOLIN ALCOHOL/MO/W.PET/CERES
1.5 CREAM (GRAM) TOPICAL NIGHTLY PRN
Status: DISCONTINUED | OUTPATIENT
Start: 2023-03-27 | End: 2023-03-28

## 2023-03-27 RX ORDER — ACETAMINOPHEN 325 MG/1
650 TABLET ORAL EVERY 6 HOURS PRN
COMMUNITY

## 2023-03-27 RX ORDER — ACETAMINOPHEN 325 MG/1
650 TABLET ORAL EVERY 6 HOURS PRN
Status: DISCONTINUED | OUTPATIENT
Start: 2023-03-27 | End: 2023-03-30 | Stop reason: HOSPADM

## 2023-03-27 RX ORDER — SODIUM CHLORIDE 0.9 % (FLUSH) 0.9 %
5-40 SYRINGE (ML) INJECTION PRN
Status: DISCONTINUED | OUTPATIENT
Start: 2023-03-27 | End: 2023-03-30 | Stop reason: HOSPADM

## 2023-03-27 RX ORDER — CALCITONIN SALMON 200 [USP'U]/ML
4 INJECTION, SOLUTION INTRAMUSCULAR; SUBCUTANEOUS ONCE
Status: COMPLETED | OUTPATIENT
Start: 2023-03-27 | End: 2023-03-27

## 2023-03-27 RX ORDER — SODIUM CHLORIDE 0.9 % (FLUSH) 0.9 %
5-40 SYRINGE (ML) INJECTION EVERY 12 HOURS SCHEDULED
Status: DISCONTINUED | OUTPATIENT
Start: 2023-03-27 | End: 2023-03-30 | Stop reason: HOSPADM

## 2023-03-27 RX ORDER — ONDANSETRON 2 MG/ML
4 INJECTION INTRAMUSCULAR; INTRAVENOUS EVERY 6 HOURS PRN
Status: DISCONTINUED | OUTPATIENT
Start: 2023-03-27 | End: 2023-03-30 | Stop reason: HOSPADM

## 2023-03-27 RX ORDER — ATORVASTATIN CALCIUM 20 MG/1
20 TABLET, FILM COATED ORAL DAILY
Status: DISCONTINUED | OUTPATIENT
Start: 2023-03-27 | End: 2023-03-30 | Stop reason: HOSPADM

## 2023-03-27 RX ORDER — HYDROCHLOROTHIAZIDE 25 MG/1
25 TABLET ORAL DAILY
COMMUNITY

## 2023-03-27 RX ORDER — GLUCAGON 1 MG/ML
1 KIT INJECTION PRN
Status: DISCONTINUED | OUTPATIENT
Start: 2023-03-27 | End: 2023-03-30 | Stop reason: HOSPADM

## 2023-03-27 RX ORDER — INSULIN LISPRO 100 [IU]/ML
0-4 INJECTION, SOLUTION INTRAVENOUS; SUBCUTANEOUS NIGHTLY
Status: DISCONTINUED | OUTPATIENT
Start: 2023-03-27 | End: 2023-03-30 | Stop reason: HOSPADM

## 2023-03-27 RX ORDER — DEXTROSE MONOHYDRATE 100 MG/ML
INJECTION, SOLUTION INTRAVENOUS CONTINUOUS PRN
Status: DISCONTINUED | OUTPATIENT
Start: 2023-03-27 | End: 2023-03-30 | Stop reason: HOSPADM

## 2023-03-27 RX ORDER — ONDANSETRON 4 MG/1
4 TABLET, ORALLY DISINTEGRATING ORAL EVERY 8 HOURS PRN
Status: DISCONTINUED | OUTPATIENT
Start: 2023-03-27 | End: 2023-03-30 | Stop reason: HOSPADM

## 2023-03-27 RX ORDER — INSULIN LISPRO 100 [IU]/ML
0-8 INJECTION, SOLUTION INTRAVENOUS; SUBCUTANEOUS
Status: DISCONTINUED | OUTPATIENT
Start: 2023-03-27 | End: 2023-03-30 | Stop reason: HOSPADM

## 2023-03-27 RX ORDER — LISINOPRIL 10 MG/1
10 TABLET ORAL NIGHTLY
COMMUNITY

## 2023-03-27 RX ORDER — ACETAMINOPHEN 650 MG/1
650 SUPPOSITORY RECTAL EVERY 6 HOURS PRN
Status: DISCONTINUED | OUTPATIENT
Start: 2023-03-27 | End: 2023-03-30 | Stop reason: HOSPADM

## 2023-03-27 RX ORDER — UREA 10 %
LOTION (ML) TOPICAL
COMMUNITY

## 2023-03-27 RX ORDER — POLYETHYLENE GLYCOL 3350 17 G/17G
17 POWDER, FOR SOLUTION ORAL DAILY PRN
Status: DISCONTINUED | OUTPATIENT
Start: 2023-03-27 | End: 2023-03-30 | Stop reason: HOSPADM

## 2023-03-27 RX ORDER — MAGNESIUM SULFATE IN WATER 40 MG/ML
2000 INJECTION, SOLUTION INTRAVENOUS PRN
Status: DISCONTINUED | OUTPATIENT
Start: 2023-03-27 | End: 2023-03-28

## 2023-03-27 RX ORDER — AMMONIUM LACTATE 12 G/100G
LOTION TOPICAL
COMMUNITY

## 2023-03-27 RX ORDER — TIMOLOL MALEATE 5 MG/ML
1 SOLUTION/ DROPS OPHTHALMIC DAILY
Status: DISCONTINUED | OUTPATIENT
Start: 2023-03-28 | End: 2023-03-30 | Stop reason: HOSPADM

## 2023-03-27 RX ORDER — LEVOTHYROXINE SODIUM 0.1 MG/1
100 TABLET ORAL DAILY
Status: DISCONTINUED | OUTPATIENT
Start: 2023-03-28 | End: 2023-03-30 | Stop reason: HOSPADM

## 2023-03-27 RX ORDER — SODIUM CHLORIDE 9 MG/ML
INJECTION, SOLUTION INTRAVENOUS PRN
Status: DISCONTINUED | OUTPATIENT
Start: 2023-03-27 | End: 2023-03-30 | Stop reason: HOSPADM

## 2023-03-27 RX ORDER — ASPIRIN 81 MG/1
81 TABLET, CHEWABLE ORAL DAILY
Status: DISCONTINUED | OUTPATIENT
Start: 2023-03-28 | End: 2023-03-30 | Stop reason: HOSPADM

## 2023-03-27 RX ORDER — AMMONIUM LACTATE 12 G/100G
LOTION TOPICAL PRN
Status: DISCONTINUED | OUTPATIENT
Start: 2023-03-27 | End: 2023-03-28

## 2023-03-27 RX ORDER — ENOXAPARIN SODIUM 100 MG/ML
40 INJECTION SUBCUTANEOUS DAILY
Status: DISCONTINUED | OUTPATIENT
Start: 2023-03-27 | End: 2023-03-30 | Stop reason: HOSPADM

## 2023-03-27 RX ADMIN — LISINOPRIL 10 MG: 10 TABLET ORAL at 20:53

## 2023-03-27 RX ADMIN — SODIUM CHLORIDE, POTASSIUM CHLORIDE, SODIUM LACTATE AND CALCIUM CHLORIDE 1000 ML: 600; 310; 30; 20 INJECTION, SOLUTION INTRAVENOUS at 17:37

## 2023-03-27 RX ADMIN — MAGNESIUM SULFATE HEPTAHYDRATE 2000 MG: 40 INJECTION, SOLUTION INTRAVENOUS at 17:35

## 2023-03-27 RX ADMIN — CEFTRIAXONE 1000 MG: 1 INJECTION, POWDER, FOR SOLUTION INTRAMUSCULAR; INTRAVENOUS at 18:34

## 2023-03-27 RX ADMIN — CALCITONIN SALMON 270 UNITS: 200 INJECTION, SOLUTION INTRAMUSCULAR; SUBCUTANEOUS at 20:54

## 2023-03-27 RX ADMIN — ENOXAPARIN SODIUM 40 MG: 100 INJECTION SUBCUTANEOUS at 20:00

## 2023-03-27 RX ADMIN — SODIUM CHLORIDE: 9 INJECTION, SOLUTION INTRAVENOUS at 20:01

## 2023-03-27 RX ADMIN — LATANOPROST 1 DROP: 50 SOLUTION OPHTHALMIC at 20:54

## 2023-03-27 RX ADMIN — Medication 1.5 MG: at 20:53

## 2023-03-27 RX ADMIN — SODIUM CHLORIDE, PRESERVATIVE FREE 10 ML: 5 INJECTION INTRAVENOUS at 20:54

## 2023-03-27 RX ADMIN — AZITHROMYCIN DIHYDRATE 500 MG: 500 INJECTION, POWDER, LYOPHILIZED, FOR SOLUTION INTRAVENOUS at 19:40

## 2023-03-27 RX ADMIN — PAMIDRONATE DISODIUM 60 MG: 3 INJECTION INTRAVENOUS at 22:13

## 2023-03-27 ASSESSMENT — ENCOUNTER SYMPTOMS
SINUS PRESSURE: 0
CHEST TIGHTNESS: 0
BACK PAIN: 0
NAUSEA: 0
VOMITING: 0
COUGH: 0
CONSTIPATION: 0
EYE PAIN: 0
DIARRHEA: 0
SHORTNESS OF BREATH: 1
SHORTNESS OF BREATH: 0
ABDOMINAL PAIN: 0
WHEEZING: 0

## 2023-03-27 NOTE — ED NOTES
Pt arriving from [facility] for AMS. Staff report pt progressively becoming more and more altered over past several days. State she is normally able to write her dinner menu out every evening and has not been able to do that for a few days now. On arrival patient is alert, oriented to self, situation, place and time. States she thinks the staff sent her d/t her being too much to take care of.       Kaley Decker RN  03/27/23 1434

## 2023-03-27 NOTE — H&P
Internal Medicine  PGY 1  History & Physical      CC: AMS      History Obtained From:  patient, facility    HISTORY OF PRESENT ILLNESS:  24-year-old female with past medical history HTN, DM2, remote breast cancer, CVA with residual right lower extremity hemiparesis [facility]. Patient reports \"feeling out of it\" for the past week and endorses intermittent confusion. Staff confirmed this to EMS and reported to them that patient was having difficulty with daily activities as well as ordering her food. She has baseline oral alert and oriented x3. In ED patient afebrile, normotensive, but requiring 2 L O2 nasal cannula. Labs remarkable for bicarb 38, calcium 12.9, albumin 3.4, lactate 3.6. BNP, troponin negative. WBC 11. Chest x-ray showing patchy infrahilar/bibasilar airspace disease. CT head was negative for any acute abnormality. On exam, patient is oriented to self and place but states \"they tell me it is much. \"  She does endorse confusion and intermittently has difficulty elaborating on events of past week. She also complains of mild shortness of breath, but denies any fever, chills, chest pain, cough, congestion, abdominal pain, nausea, vomiting, flank pain, or urinary symptoms.     Past Medical History:        Diagnosis Date    Cancer [facility])     breast    Cerebral artery occlusion with cerebral infarction ([facility] [address]     Glaucoma     Hyperlipidemia     Hypertension     CAR[place]INA (nonalcoholic steatohepatitis)     PPD positive     Thyroid disease     Type II or unspecified type diabetes mellitus without mention of complication, not stated as uncontrolled        Past Surgical History:        Procedure Laterality Date    APPENDECTOMY  14 years ago    BREAST BIOPSY  2000    left    BREAST SURGERY      bilateral mastectomy    COLONOSCOPY  8/30/2011    DILATION AND CURETTAGE OF UTERUS  1975    JOINT REPLACEMENT Right     hip    MASTECTOMY, BILATERAL      OTHER

## 2023-03-27 NOTE — ED NOTES
ED TO INPATIENT SBAR HANDOFF    Patient Name: Khushboo Miller   :  1945  76 y.o. MRN:  [id number]  Preferred Name  Darek Maldonado  ED Room #:  Y43/O03-26  Family/Caregiver Present no   Restraints no   Sitter no   Sepsis Risk Score Sepsis Risk Score: 2.22    Situation  Code Status: DNR-CCA Limited Code details: Intubation/Re-intubation No Comment; Defibrillation/Cardioversion No Comment; Chest Compressions No Comment; Resuscitative Medications No Comment; Other No Comment  . Allergies: Adhesive tape, Bactrim [sulfamethoxazole-trimethoprim], Cephalexin, Ciprofloxacin, Florinef [fludrocortisone], Other, Penicillins, Sulfa antibiotics, Trimethoprim, and Vancomycin  Weight: Patient Vitals for the past 96 hrs (Last 3 readings):   Weight   23 1441 148 lb 4.8 oz (67.3 kg)     Arrived from: home  Chief Complaint:   Chief Complaint   Patient presents with    Altered Mental Status     From nursing home, staff was concerned for acute mental status change, confusion, has a history of stroke, baseline right side deficit. Hospital Problem/Diagnosis:  Principal Problem:    Hypercalcemia  Resolved Problems:    * No resolved hospital problems. *    Imaging:   CT Head W/O Contrast   Final Result      No acute intracranial abnormality. XR CHEST PORTABLE   Final Result      1. Patchy infrahilar and bibasilar accentuated markings or airspace disease with some bronchial thickening. New compared to prior      2. No sign of pneumothorax.                  Abnormal labs:   Abnormal Labs Reviewed   CBC WITH AUTO DIFFERENTIAL - Abnormal; Notable for the following components:       Result Value    MCV 79.4 (*)     MCH 25.6 (*)     Neutrophils Absolute 8.3 (*)     All other components within normal limits   COMPREHENSIVE METABOLIC PANEL W/ REFLEX TO MG FOR LOW K - Abnormal; Notable for the following components:    Sodium 133 (*)     Chloride 87 (*)     CO2 38 (*)     Glucose 115 (*)     Creatinine 0.5 (*)     Calcium 12.9 (*)

## 2023-03-28 LAB
25(OH)D3 SERPL-MCNC: 52.8 NG/ML
ALBUMIN SERPL-MCNC: 3.1 G/DL (ref 3.4–5)
ALBUMIN SERPL-MCNC: 3.1 G/DL (ref 3.4–5)
ALBUMIN SERPL-MCNC: 3.3 G/DL (ref 3.4–5)
ANION GAP SERPL CALCULATED.3IONS-SCNC: 4 MMOL/L (ref 3–16)
ANION GAP SERPL CALCULATED.3IONS-SCNC: 7 MMOL/L (ref 3–16)
ANION GAP SERPL CALCULATED.3IONS-SCNC: 8 MMOL/L (ref 3–16)
BASOPHILS # BLD: 0 K/UL (ref 0–0.2)
BASOPHILS NFR BLD: 0.3 %
BUN SERPL-MCNC: 14 MG/DL (ref 7–20)
BUN SERPL-MCNC: 15 MG/DL (ref 7–20)
BUN SERPL-MCNC: 17 MG/DL (ref 7–20)
CA-I BLD-SCNC: 1.29 MMOL/L (ref 1.12–1.32)
CALCIUM SERPL-MCNC: 10.3 MG/DL (ref 8.3–10.6)
CALCIUM SERPL-MCNC: 10.9 MG/DL (ref 8.3–10.6)
CALCIUM SERPL-MCNC: 11.4 MG/DL (ref 8.3–10.6)
CHLORIDE SERPL-SCNC: 92 MMOL/L (ref 99–110)
CHLORIDE SERPL-SCNC: 95 MMOL/L (ref 99–110)
CHLORIDE SERPL-SCNC: 96 MMOL/L (ref 99–110)
CO2 SERPL-SCNC: 35 MMOL/L (ref 21–32)
CO2 SERPL-SCNC: 38 MMOL/L (ref 21–32)
CO2 SERPL-SCNC: 38 MMOL/L (ref 21–32)
CREAT SERPL-MCNC: <0.5 MG/DL (ref 0.6–1.2)
DEPRECATED RDW RBC AUTO: 14.7 % (ref 12.4–15.4)
EOSINOPHIL # BLD: 0.1 K/UL (ref 0–0.6)
EOSINOPHIL NFR BLD: 1.9 %
GFR SERPLBLD CREATININE-BSD FMLA CKD-EPI: >60 ML/MIN/{1.73_M2}
GLUCOSE BLD-MCNC: 131 MG/DL (ref 70–99)
GLUCOSE BLD-MCNC: 142 MG/DL (ref 70–99)
GLUCOSE BLD-MCNC: 156 MG/DL (ref 70–99)
GLUCOSE BLD-MCNC: 216 MG/DL (ref 70–99)
GLUCOSE SERPL-MCNC: 144 MG/DL (ref 70–99)
GLUCOSE SERPL-MCNC: 151 MG/DL (ref 70–99)
GLUCOSE SERPL-MCNC: 191 MG/DL (ref 70–99)
HCT VFR BLD AUTO: 33.5 % (ref 36–48)
HGB BLD-MCNC: 11.2 G/DL (ref 12–16)
LACTATE BLDV-SCNC: 1 MMOL/L (ref 0.4–2)
LYMPHOCYTES # BLD: 1.3 K/UL (ref 1–5.1)
LYMPHOCYTES NFR BLD: 17.2 %
MAGNESIUM SERPL-MCNC: 1.4 MG/DL (ref 1.8–2.4)
MAGNESIUM SERPL-MCNC: 1.4 MG/DL (ref 1.8–2.4)
MAGNESIUM SERPL-MCNC: 1.6 MG/DL (ref 1.8–2.4)
MCH RBC QN AUTO: 26.9 PG (ref 26–34)
MCHC RBC AUTO-ENTMCNC: 33.6 G/DL (ref 31–36)
MCV RBC AUTO: 80.1 FL (ref 80–100)
MONOCYTES # BLD: 0.5 K/UL (ref 0–1.3)
MONOCYTES NFR BLD: 6.8 %
NEUTROPHILS # BLD: 5.6 K/UL (ref 1.7–7.7)
NEUTROPHILS NFR BLD: 73.8 %
PERFORMED ON: ABNORMAL
PH VENOUS: 7.46 (ref 7.35–7.45)
PHOSPHATE SERPL-MCNC: 1.9 MG/DL (ref 2.5–4.9)
PHOSPHATE SERPL-MCNC: 2.2 MG/DL (ref 2.5–4.9)
PHOSPHATE SERPL-MCNC: 3.5 MG/DL (ref 2.5–4.9)
PLATELET # BLD AUTO: 137 K/UL (ref 135–450)
PMV BLD AUTO: 7.7 FL (ref 5–10.5)
POTASSIUM SERPL-SCNC: 2.7 MMOL/L (ref 3.5–5.1)
POTASSIUM SERPL-SCNC: 3.2 MMOL/L (ref 3.5–5.1)
POTASSIUM SERPL-SCNC: 3.4 MMOL/L (ref 3.5–5.1)
PROCALCITONIN SERPL IA-MCNC: 0.07 NG/ML (ref 0–0.15)
PTH-INTACT SERPL-MCNC: 7.1 PG/ML (ref 14–72)
RBC # BLD AUTO: 4.18 M/UL (ref 4–5.2)
RSV SOURCE: NORMAL
SODIUM SERPL-SCNC: 137 MMOL/L (ref 136–145)
SODIUM SERPL-SCNC: 137 MMOL/L (ref 136–145)
SODIUM SERPL-SCNC: 139 MMOL/L (ref 136–145)
WBC # BLD AUTO: 7.7 K/UL (ref 4–11)

## 2023-03-28 PROCEDURE — 6370000000 HC RX 637 (ALT 250 FOR IP)

## 2023-03-28 PROCEDURE — 84443 ASSAY THYROID STIM HORMONE: CPT

## 2023-03-28 PROCEDURE — 2580000003 HC RX 258: Performed by: STUDENT IN AN ORGANIZED HEALTH CARE EDUCATION/TRAINING PROGRAM

## 2023-03-28 PROCEDURE — 2580000003 HC RX 258

## 2023-03-28 PROCEDURE — 80069 RENAL FUNCTION PANEL: CPT

## 2023-03-28 PROCEDURE — 82542 COL CHROMOTOGRAPHY QUAL/QUAN: CPT

## 2023-03-28 PROCEDURE — 36415 COLL VENOUS BLD VENIPUNCTURE: CPT

## 2023-03-28 PROCEDURE — 6360000002 HC RX W HCPCS

## 2023-03-28 PROCEDURE — 6360000002 HC RX W HCPCS: Performed by: STUDENT IN AN ORGANIZED HEALTH CARE EDUCATION/TRAINING PROGRAM

## 2023-03-28 PROCEDURE — 2500000003 HC RX 250 WO HCPCS: Performed by: STUDENT IN AN ORGANIZED HEALTH CARE EDUCATION/TRAINING PROGRAM

## 2023-03-28 PROCEDURE — 83735 ASSAY OF MAGNESIUM: CPT

## 2023-03-28 PROCEDURE — 1200000000 HC SEMI PRIVATE

## 2023-03-28 PROCEDURE — 85025 COMPLETE CBC W/AUTO DIFF WBC: CPT

## 2023-03-28 PROCEDURE — 82330 ASSAY OF CALCIUM: CPT

## 2023-03-28 PROCEDURE — 6370000000 HC RX 637 (ALT 250 FOR IP): Performed by: STUDENT IN AN ORGANIZED HEALTH CARE EDUCATION/TRAINING PROGRAM

## 2023-03-28 RX ORDER — AMMONIUM LACTATE 12 G/100G
LOTION TOPICAL DAILY PRN
Status: DISCONTINUED | OUTPATIENT
Start: 2023-03-28 | End: 2023-03-30 | Stop reason: HOSPADM

## 2023-03-28 RX ORDER — POTASSIUM CHLORIDE 20 MEQ/1
40 TABLET, EXTENDED RELEASE ORAL ONCE
Status: COMPLETED | OUTPATIENT
Start: 2023-03-28 | End: 2023-03-28

## 2023-03-28 RX ORDER — MAGNESIUM SULFATE IN WATER 40 MG/ML
2000 INJECTION, SOLUTION INTRAVENOUS ONCE
Status: DISCONTINUED | OUTPATIENT
Start: 2023-03-28 | End: 2023-03-28

## 2023-03-28 RX ORDER — LANOLIN ALCOHOL/MO/W.PET/CERES
6 CREAM (GRAM) TOPICAL NIGHTLY
Status: DISCONTINUED | OUTPATIENT
Start: 2023-03-28 | End: 2023-03-30 | Stop reason: HOSPADM

## 2023-03-28 RX ORDER — MAGNESIUM SULFATE IN WATER 40 MG/ML
2000 INJECTION, SOLUTION INTRAVENOUS ONCE
Status: COMPLETED | OUTPATIENT
Start: 2023-03-28 | End: 2023-03-28

## 2023-03-28 RX ORDER — POTASSIUM CHLORIDE 7.45 MG/ML
10 INJECTION INTRAVENOUS
Status: COMPLETED | OUTPATIENT
Start: 2023-03-28 | End: 2023-03-28

## 2023-03-28 RX ORDER — MAGNESIUM SULFATE IN WATER 40 MG/ML
2000 INJECTION, SOLUTION INTRAVENOUS ONCE
Status: COMPLETED | OUTPATIENT
Start: 2023-03-28 | End: 2023-03-29

## 2023-03-28 RX ADMIN — CEFTRIAXONE 1000 MG: 1 INJECTION, POWDER, FOR SOLUTION INTRAMUSCULAR; INTRAVENOUS at 17:26

## 2023-03-28 RX ADMIN — ENOXAPARIN SODIUM 40 MG: 100 INJECTION SUBCUTANEOUS at 08:40

## 2023-03-28 RX ADMIN — LEVOTHYROXINE SODIUM 100 MCG: 0.1 TABLET ORAL at 10:16

## 2023-03-28 RX ADMIN — LISINOPRIL 10 MG: 10 TABLET ORAL at 20:33

## 2023-03-28 RX ADMIN — TIMOLOL MALEATE 1 DROP: 5 SOLUTION OPHTHALMIC at 08:48

## 2023-03-28 RX ADMIN — POTASSIUM CHLORIDE 40 MEQ: 20 TABLET, EXTENDED RELEASE ORAL at 02:18

## 2023-03-28 RX ADMIN — POTASSIUM PHOSPHATE, MONOBASIC AND POTASSIUM PHOSPHATE, DIBASIC 20 MMOL: 224; 236 INJECTION, SOLUTION, CONCENTRATE INTRAVENOUS at 08:39

## 2023-03-28 RX ADMIN — MAGNESIUM SULFATE HEPTAHYDRATE 2000 MG: 40 INJECTION, SOLUTION INTRAVENOUS at 22:16

## 2023-03-28 RX ADMIN — ASPIRIN 81 MG 81 MG: 81 TABLET ORAL at 08:41

## 2023-03-28 RX ADMIN — SODIUM CHLORIDE: 9 INJECTION, SOLUTION INTRAVENOUS at 19:02

## 2023-03-28 RX ADMIN — POTASSIUM BICARBONATE 40 MEQ: 782 TABLET, EFFERVESCENT ORAL at 22:13

## 2023-03-28 RX ADMIN — MAGNESIUM SULFATE HEPTAHYDRATE 2000 MG: 2 INJECTION, SOLUTION INTRAVENOUS at 06:05

## 2023-03-28 RX ADMIN — POTASSIUM CHLORIDE 10 MEQ: 10 INJECTION, SOLUTION INTRAVENOUS at 03:18

## 2023-03-28 RX ADMIN — ATORVASTATIN CALCIUM 20 MG: 20 TABLET, FILM COATED ORAL at 08:41

## 2023-03-28 RX ADMIN — POTASSIUM CHLORIDE 10 MEQ: 10 INJECTION, SOLUTION INTRAVENOUS at 04:26

## 2023-03-28 RX ADMIN — LATANOPROST 1 DROP: 50 SOLUTION OPHTHALMIC at 20:28

## 2023-03-28 RX ADMIN — Medication 6 MG: at 20:33

## 2023-03-28 RX ADMIN — POTASSIUM CHLORIDE 10 MEQ: 10 INJECTION, SOLUTION INTRAVENOUS at 02:18

## 2023-03-28 RX ADMIN — SODIUM CHLORIDE: 9 INJECTION, SOLUTION INTRAVENOUS at 06:05

## 2023-03-28 NOTE — CONSULTS
medications. I repeated pertinent portions of the examination and reviewed the relevant imaging and laboratory data.  I agree with the findings, assessment and plan as documented, with the following addendum:      Carmenza Morelos MD

## 2023-03-28 NOTE — ED PROVIDER NOTES
ED Attending Attestation Note     Date of evaluation: 3/27/2023    This patient was seen by the resident. I have seen and examined the patient, agree with the workup, evaluation, management and diagnosis. The care plan has been discussed. I have reviewed the ECG and concur with the resident's interpretation. My assessment reveals adult female with some disorientation today, apparently different from her baseline, with soft nontender abdomen. She is moving her bilateral upper extremities and left lower extremity at baseline, essentially no movement of the right lower extremity due to previous CVA which is unchanged today. She is noted to be markedly hypercalcemic, which may be responsible for her altered mental status.   Also noted to have some likely consolidation in the right lower lobe which may represent pneumonia and we will go ahead and treat        Lisa Mcmillan MD  03/27/23 3950
limits. On exam, patient is well-appearing and is alert and oriented. Exam is remarkable for weakness of the right lower extremity that the patient reports is consistent with her baseline, but otherwise without evidence of focal neurological deficits. We will assess for infectious, metabolic, and traumatic causes of altered mental status with EKG, chest x-ray, head CT, CBC, CMP, UA, troponin, VBG, lactate. Low suspicion for ischemic stroke based on lack of new neurological deficits on exam, and insidious and gradual onset of symptoms, so vessel imaging will not be obtained at this time. Is this patient to be included in the SEP-1 core measure due to severe sepsis or septic shock? No Exclusion criteria - the patient is NOT to be included for SEP-1 Core Measure due to: 2+ SIRS criteria are not met    Medical Decision Making  Amount and/or Complexity of Data Reviewed  Independent Historian: caregiver  Labs: ordered. Decision-making details documented in ED Course. Radiology: ordered. ECG/medicine tests: ordered. Risk  Decision regarding hospitalization. This patient was also evaluated by the attending physician. All care plans werediscussed and agreed upon. Clinical Impression     No diagnosis found. Disposition     PATIENT REFERRED TO:  No follow-up provider specified. DISCHARGE MEDICATIONS:  New Prescriptions    No medications on file       DISPOSITION          Diagnostic Results and Other Data     RADIOLOGY:  XR CHEST PORTABLE    (Results Pending)   CT Head W/O Contrast    (Results Pending)       LABS:   No results found for this visit on 03/27/23. EKG   Interpreted in conjunction with emergencydepartment physician No att. providers found  Rhythm: normal sinus   Rate: normal  Axis: left  Ectopy: none  Conduction: normal  ST Segments: normal  T Waves:no acute change    Clinical Impression: non-specific EKG    ED BEDSIDE ULTRASOUND:  No results found.     RECENT VITALS:  BP: 129/69, Temp:

## 2023-03-29 LAB
ALBUMIN SERPL-MCNC: 2.7 G/DL (ref 3.4–5)
ALBUMIN SERPL-MCNC: 3 G/DL (ref 3.4–5)
ANION GAP SERPL CALCULATED.3IONS-SCNC: 10 MMOL/L (ref 3–16)
ANION GAP SERPL CALCULATED.3IONS-SCNC: 6 MMOL/L (ref 3–16)
BACTERIA UR CULT: ABNORMAL
BASOPHILS # BLD: 0 K/UL (ref 0–0.2)
BASOPHILS NFR BLD: 0.4 %
BUN SERPL-MCNC: 15 MG/DL (ref 7–20)
BUN SERPL-MCNC: 16 MG/DL (ref 7–20)
CALCIUM SERPL-MCNC: 10 MG/DL (ref 8.3–10.6)
CALCIUM SERPL-MCNC: 10.2 MG/DL (ref 8.3–10.6)
CHLORIDE SERPL-SCNC: 96 MMOL/L (ref 99–110)
CHLORIDE SERPL-SCNC: 99 MMOL/L (ref 99–110)
CO2 SERPL-SCNC: 29 MMOL/L (ref 21–32)
CO2 SERPL-SCNC: 35 MMOL/L (ref 21–32)
CREAT SERPL-MCNC: 0.5 MG/DL (ref 0.6–1.2)
CREAT SERPL-MCNC: 0.5 MG/DL (ref 0.6–1.2)
DEPRECATED RDW RBC AUTO: 15.2 % (ref 12.4–15.4)
EOSINOPHIL # BLD: 0.1 K/UL (ref 0–0.6)
EOSINOPHIL NFR BLD: 1.7 %
GFR SERPLBLD CREATININE-BSD FMLA CKD-EPI: >60 ML/MIN/{1.73_M2}
GFR SERPLBLD CREATININE-BSD FMLA CKD-EPI: >60 ML/MIN/{1.73_M2}
GLUCOSE BLD-MCNC: 119 MG/DL (ref 70–99)
GLUCOSE BLD-MCNC: 146 MG/DL (ref 70–99)
GLUCOSE BLD-MCNC: 164 MG/DL (ref 70–99)
GLUCOSE BLD-MCNC: 177 MG/DL (ref 70–99)
GLUCOSE SERPL-MCNC: 139 MG/DL (ref 70–99)
GLUCOSE SERPL-MCNC: 167 MG/DL (ref 70–99)
HCT VFR BLD AUTO: 32.1 % (ref 36–48)
HGB BLD-MCNC: 10.7 G/DL (ref 12–16)
LYMPHOCYTES # BLD: 1 K/UL (ref 1–5.1)
LYMPHOCYTES NFR BLD: 13.7 %
MAGNESIUM SERPL-MCNC: 1.8 MG/DL (ref 1.8–2.4)
MAGNESIUM SERPL-MCNC: 1.9 MG/DL (ref 1.8–2.4)
MCH RBC QN AUTO: 26.5 PG (ref 26–34)
MCHC RBC AUTO-ENTMCNC: 33.4 G/DL (ref 31–36)
MCV RBC AUTO: 79.4 FL (ref 80–100)
MONOCYTES # BLD: 0.6 K/UL (ref 0–1.3)
MONOCYTES NFR BLD: 7.7 %
NEUTROPHILS # BLD: 5.7 K/UL (ref 1.7–7.7)
NEUTROPHILS NFR BLD: 76.5 %
ORGANISM: ABNORMAL
PERFORMED ON: ABNORMAL
PHOSPHATE SERPL-MCNC: 3.4 MG/DL (ref 2.5–4.9)
PHOSPHATE SERPL-MCNC: 3.9 MG/DL (ref 2.5–4.9)
PLATELET # BLD AUTO: 129 K/UL (ref 135–450)
PMV BLD AUTO: 7.3 FL (ref 5–10.5)
POTASSIUM SERPL-SCNC: 3.5 MMOL/L (ref 3.5–5.1)
POTASSIUM SERPL-SCNC: 4.5 MMOL/L (ref 3.5–5.1)
RBC # BLD AUTO: 4.04 M/UL (ref 4–5.2)
SODIUM SERPL-SCNC: 135 MMOL/L (ref 136–145)
SODIUM SERPL-SCNC: 140 MMOL/L (ref 136–145)
TSH SERPL DL<=0.005 MIU/L-ACNC: 0.45 UIU/ML (ref 0.27–4.2)
WBC # BLD AUTO: 7.5 K/UL (ref 4–11)

## 2023-03-29 PROCEDURE — 85025 COMPLETE CBC W/AUTO DIFF WBC: CPT

## 2023-03-29 PROCEDURE — 6360000002 HC RX W HCPCS

## 2023-03-29 PROCEDURE — 1200000000 HC SEMI PRIVATE

## 2023-03-29 PROCEDURE — 36415 COLL VENOUS BLD VENIPUNCTURE: CPT

## 2023-03-29 PROCEDURE — 6370000000 HC RX 637 (ALT 250 FOR IP)

## 2023-03-29 PROCEDURE — 2580000003 HC RX 258

## 2023-03-29 PROCEDURE — 83735 ASSAY OF MAGNESIUM: CPT

## 2023-03-29 PROCEDURE — 80069 RENAL FUNCTION PANEL: CPT

## 2023-03-29 PROCEDURE — 6370000000 HC RX 637 (ALT 250 FOR IP): Performed by: STUDENT IN AN ORGANIZED HEALTH CARE EDUCATION/TRAINING PROGRAM

## 2023-03-29 PROCEDURE — 2580000003 HC RX 258: Performed by: STUDENT IN AN ORGANIZED HEALTH CARE EDUCATION/TRAINING PROGRAM

## 2023-03-29 PROCEDURE — 6360000002 HC RX W HCPCS: Performed by: STUDENT IN AN ORGANIZED HEALTH CARE EDUCATION/TRAINING PROGRAM

## 2023-03-29 RX ORDER — POTASSIUM CHLORIDE 20 MEQ/1
40 TABLET, EXTENDED RELEASE ORAL ONCE
Status: DISCONTINUED | OUTPATIENT
Start: 2023-03-29 | End: 2023-03-29

## 2023-03-29 RX ORDER — LISINOPRIL 10 MG/1
10 TABLET ORAL ONCE
Status: COMPLETED | OUTPATIENT
Start: 2023-03-29 | End: 2023-03-29

## 2023-03-29 RX ORDER — LANOLIN ALCOHOL/MO/W.PET/CERES
400 CREAM (GRAM) TOPICAL
Status: COMPLETED | OUTPATIENT
Start: 2023-03-29 | End: 2023-03-29

## 2023-03-29 RX ADMIN — CEFTRIAXONE 1000 MG: 1 INJECTION, POWDER, FOR SOLUTION INTRAMUSCULAR; INTRAVENOUS at 19:00

## 2023-03-29 RX ADMIN — TIMOLOL MALEATE 1 DROP: 5 SOLUTION OPHTHALMIC at 09:06

## 2023-03-29 RX ADMIN — LISINOPRIL 10 MG: 10 TABLET ORAL at 21:28

## 2023-03-29 RX ADMIN — Medication 400 MG: at 15:47

## 2023-03-29 RX ADMIN — ASPIRIN 81 MG 81 MG: 81 TABLET ORAL at 09:05

## 2023-03-29 RX ADMIN — SODIUM CHLORIDE, PRESERVATIVE FREE 10 ML: 5 INJECTION INTRAVENOUS at 09:06

## 2023-03-29 RX ADMIN — LEVOTHYROXINE SODIUM 100 MCG: 0.1 TABLET ORAL at 06:25

## 2023-03-29 RX ADMIN — ATORVASTATIN CALCIUM 20 MG: 20 TABLET, FILM COATED ORAL at 09:05

## 2023-03-29 RX ADMIN — SODIUM CHLORIDE: 9 INJECTION, SOLUTION INTRAVENOUS at 03:32

## 2023-03-29 RX ADMIN — ACETAMINOPHEN 650 MG: 325 TABLET ORAL at 15:48

## 2023-03-29 RX ADMIN — ENOXAPARIN SODIUM 40 MG: 100 INJECTION SUBCUTANEOUS at 09:05

## 2023-03-29 RX ADMIN — Medication 6 MG: at 21:28

## 2023-03-29 RX ADMIN — POTASSIUM BICARBONATE 40 MEQ: 782 TABLET, EFFERVESCENT ORAL at 09:25

## 2023-03-29 RX ADMIN — SODIUM CHLORIDE, PRESERVATIVE FREE 10 ML: 5 INJECTION INTRAVENOUS at 21:34

## 2023-03-29 RX ADMIN — LATANOPROST 1 DROP: 50 SOLUTION OPHTHALMIC at 21:31

## 2023-03-29 RX ADMIN — Medication 400 MG: at 12:06

## 2023-03-29 RX ADMIN — LISINOPRIL 10 MG: 10 TABLET ORAL at 12:34

## 2023-03-29 ASSESSMENT — PAIN - FUNCTIONAL ASSESSMENT: PAIN_FUNCTIONAL_ASSESSMENT: ACTIVITIES ARE NOT PREVENTED

## 2023-03-29 ASSESSMENT — PAIN DESCRIPTION - LOCATION: LOCATION: HEAD

## 2023-03-29 ASSESSMENT — PAIN DESCRIPTION - ORIENTATION: ORIENTATION: MID

## 2023-03-29 ASSESSMENT — PAIN DESCRIPTION - DESCRIPTORS: DESCRIPTORS: ACHING

## 2023-03-29 ASSESSMENT — PAIN SCALES - GENERAL: PAINLEVEL_OUTOF10: 3

## 2023-03-29 NOTE — PLAN OF CARE
Problem: Safety - Adult  Goal: Free from fall injury  3/28/2023 2152 by [facility] Mel Rodriguez RN  Outcome: Progressing  Note: Instruct patient on use of call light and ensure that side table with patient reach      Problem: Skin/Tissue Integrity  Goal: Absence of new skin breakdown  Description: 1. Monitor for areas of redness and/or skin breakdown  2. Assess vascular access sites hourly  3. Every 4-6 hours minimum:  Change oxygen saturation probe site  4. Every 4-6 hours:  If on nasal continuous positive airway pressure, respiratory therapy assess nares and determine need for appliance change or resting period.   Outcome: Progressing  Note: Patient has stage 2 pressure ulcer on the coccyx area, every 2 hours turning and ensure back is dry and clean     Problem: Respiratory - Adult  Goal: Achieves optimal ventilation and oxygenation  Flowsheets (Taken 3/28/2023 2152)  Achieves optimal ventilation and oxygenation:   Assess for changes in respiratory status   Assess for changes in mentation and behavior   Position to facilitate oxygenation and minimize respiratory effort   Oxygen supplementation based on oxygen saturation or arterial blood gases     Problem: Discharge Planning  Goal: Discharge to home or other facility with appropriate resources  Outcome: Progressing

## 2023-03-30 ENCOUNTER — APPOINTMENT (OUTPATIENT)
Dept: CT IMAGING | Age: 78
DRG: 640 | End: 2023-03-30
Payer: MEDICARE

## 2023-03-30 VITALS
OXYGEN SATURATION: 93 % | DIASTOLIC BLOOD PRESSURE: 85 MMHG | RESPIRATION RATE: 18 BRPM | BODY MASS INDEX: 26.48 KG/M2 | HEART RATE: 85 BPM | SYSTOLIC BLOOD PRESSURE: 152 MMHG | WEIGHT: 149.47 LBS | TEMPERATURE: 97.3 F | HEIGHT: 63 IN

## 2023-03-30 LAB
ALBUMIN SERPL-MCNC: 2.9 G/DL (ref 3.4–5)
ANION GAP SERPL CALCULATED.3IONS-SCNC: 6 MMOL/L (ref 3–16)
BASOPHILS # BLD: 0.1 K/UL (ref 0–0.2)
BASOPHILS NFR BLD: 0.6 %
BUN SERPL-MCNC: 15 MG/DL (ref 7–20)
CALCIUM SERPL-MCNC: 10.1 MG/DL (ref 8.3–10.6)
CHLORIDE SERPL-SCNC: 97 MMOL/L (ref 99–110)
CO2 SERPL-SCNC: 36 MMOL/L (ref 21–32)
CREAT SERPL-MCNC: 0.6 MG/DL (ref 0.6–1.2)
DEPRECATED RDW RBC AUTO: 15.3 % (ref 12.4–15.4)
EOSINOPHIL # BLD: 0.1 K/UL (ref 0–0.6)
EOSINOPHIL NFR BLD: 1.5 %
GFR SERPLBLD CREATININE-BSD FMLA CKD-EPI: >60 ML/MIN/{1.73_M2}
GLUCOSE BLD-MCNC: 152 MG/DL (ref 70–99)
GLUCOSE BLD-MCNC: 168 MG/DL (ref 70–99)
GLUCOSE SERPL-MCNC: 152 MG/DL (ref 70–99)
HCT VFR BLD AUTO: 32.2 % (ref 36–48)
HGB BLD-MCNC: 10.7 G/DL (ref 12–16)
LYMPHOCYTES # BLD: 1.1 K/UL (ref 1–5.1)
LYMPHOCYTES NFR BLD: 12.8 %
MAGNESIUM SERPL-MCNC: 1.5 MG/DL (ref 1.8–2.4)
MCH RBC QN AUTO: 26.6 PG (ref 26–34)
MCHC RBC AUTO-ENTMCNC: 33.2 G/DL (ref 31–36)
MCV RBC AUTO: 80.3 FL (ref 80–100)
MONOCYTES # BLD: 0.5 K/UL (ref 0–1.3)
MONOCYTES NFR BLD: 5.6 %
NEUTROPHILS # BLD: 6.6 K/UL (ref 1.7–7.7)
NEUTROPHILS NFR BLD: 79.5 %
PERFORMED ON: ABNORMAL
PERFORMED ON: ABNORMAL
PHOSPHATE SERPL-MCNC: 4.5 MG/DL (ref 2.5–4.9)
PLATELET # BLD AUTO: 135 K/UL (ref 135–450)
PMV BLD AUTO: 7.2 FL (ref 5–10.5)
POTASSIUM SERPL-SCNC: 3.8 MMOL/L (ref 3.5–5.1)
RBC # BLD AUTO: 4 M/UL (ref 4–5.2)
SODIUM SERPL-SCNC: 139 MMOL/L (ref 136–145)
WBC # BLD AUTO: 8.4 K/UL (ref 4–11)

## 2023-03-30 PROCEDURE — 82164 ANGIOTENSIN I ENZYME TEST: CPT

## 2023-03-30 PROCEDURE — 71250 CT THORAX DX C-: CPT

## 2023-03-30 PROCEDURE — 2580000003 HC RX 258

## 2023-03-30 PROCEDURE — 87449 NOS EACH ORGANISM AG IA: CPT

## 2023-03-30 PROCEDURE — 85025 COMPLETE CBC W/AUTO DIFF WBC: CPT

## 2023-03-30 PROCEDURE — 6370000000 HC RX 637 (ALT 250 FOR IP)

## 2023-03-30 PROCEDURE — 80069 RENAL FUNCTION PANEL: CPT

## 2023-03-30 PROCEDURE — 36415 COLL VENOUS BLD VENIPUNCTURE: CPT

## 2023-03-30 PROCEDURE — 82652 VIT D 1 25-DIHYDROXY: CPT

## 2023-03-30 PROCEDURE — 0202U NFCT DS 22 TRGT SARS-COV-2: CPT

## 2023-03-30 PROCEDURE — 83735 ASSAY OF MAGNESIUM: CPT

## 2023-03-30 PROCEDURE — 6370000000 HC RX 637 (ALT 250 FOR IP): Performed by: STUDENT IN AN ORGANIZED HEALTH CARE EDUCATION/TRAINING PROGRAM

## 2023-03-30 RX ORDER — CEPHALEXIN 500 MG/1
500 CAPSULE ORAL 2 TIMES DAILY
Qty: 6 CAPSULE | Refills: 0 | Status: SHIPPED | OUTPATIENT
Start: 2023-03-30 | End: 2023-04-02

## 2023-03-30 RX ORDER — CEPHALEXIN 500 MG/1
500 CAPSULE ORAL 4 TIMES DAILY
Qty: 12 CAPSULE | Refills: 0 | Status: SHIPPED | OUTPATIENT
Start: 2023-03-30 | End: 2023-03-30 | Stop reason: SDUPTHER

## 2023-03-30 RX ORDER — LANOLIN ALCOHOL/MO/W.PET/CERES
400 CREAM (GRAM) TOPICAL
Status: COMPLETED | OUTPATIENT
Start: 2023-03-30 | End: 2023-03-30

## 2023-03-30 RX ORDER — MAGNESIUM SULFATE IN WATER 40 MG/ML
2000 INJECTION, SOLUTION INTRAVENOUS ONCE
Status: DISCONTINUED | OUTPATIENT
Start: 2023-03-30 | End: 2023-03-30

## 2023-03-30 RX ADMIN — SODIUM CHLORIDE, PRESERVATIVE FREE 10 ML: 5 INJECTION INTRAVENOUS at 09:15

## 2023-03-30 RX ADMIN — ATORVASTATIN CALCIUM 20 MG: 20 TABLET, FILM COATED ORAL at 09:14

## 2023-03-30 RX ADMIN — Medication 400 MG: at 15:36

## 2023-03-30 RX ADMIN — Medication 400 MG: at 09:16

## 2023-03-30 RX ADMIN — ASPIRIN 81 MG 81 MG: 81 TABLET ORAL at 09:14

## 2023-03-30 RX ADMIN — TIMOLOL MALEATE 1 DROP: 5 SOLUTION OPHTHALMIC at 09:17

## 2023-03-30 RX ADMIN — LEVOTHYROXINE SODIUM 100 MCG: 0.1 TABLET ORAL at 06:33

## 2023-03-30 NOTE — PROGRESS NOTES
4 Eyes Admission Assessment     I agree as the admission nurse that 2 RN's have performed a thorough Head to Toe Skin Assessment on the patient. ALL assessment sites listed below have been assessed on admission. Areas assessed by both nurses:   [x]   Head, Face, and Ears   [x]   Shoulders, Back, and Chest  [x]   Arms, Elbows, and Hands   [x]   Coccyx, Sacrum, and Ischium  [x]   Legs, Feet, and Heels        Does the Patient have Skin Breakdown?   Yes a wound was noted on the Admission Assessment and an LDA was Initiated documentation include the [place]-wound, Wound Assessment, Measurements, Dressing Treatment, Drainage, and Color\",         Rikki Prevention initiated:  Yes   Wound Care Orders initiated:  Yes      [address]  nurse consulted for Pressure Injury (Stage 3,4, Unstageable, DTI, NWPT, and Complex wounds) or Rikki score 18 or lower:  NA      Nurse 1 eSignature: Electronically signed by Estefany Polanco RN on 3/28/23 at 6:53 PM EDT    **SHARE this note so that the co-signing nurse is able to place an eSignature**    Nurse 2 eSignature: Electronically signed by Shivani Redmond RN on 3/28/23 at 6:58 PM EDT
Comprehensive Nutrition Assessment    RECOMMENDATIONS:  PO Diet: Regular; 4 carb choices  ONS: Glucerna (shahzad) bid  Nutrition Education: No recommendation at this time       NUTRITION ASSESSMENT:   Nutritional summary & status: Nutrition assessment triggered by wound. Pt with increased protein needs related to wound healing due to PI St 2 on Coccyx, PI St 1 lt and rt heels. Visited pt this am. Reports decrease in appetite prior to admit, however, not sure about weight loss. Currently on a Regular; 4 carb choices diet.  lbs. No recent wt hx available per EMR. Did note weight of 167 lbs on 6/25, indicating weight loss of 11% in 9 months. Agreeable to drink Ensure supplements for increased nutrition. Glucerna appropriate due to hx of DM and elevated BG. Prefers shahzad flavor. Glucerna will provide additional protein to assist with healing of wounds. Will continue to monitor po intake and acceptance of ONS. Admission/PMH: Admit; Hypercalcemia, AMS   PMHx:cancer, cerebral artery occlusion, HLD, HTN, [place], T2DM, Thyroid Disorder    MALNUTRITION ASSESSMENT  Context of Malnutrition: Acute Illness   Malnutrition Status:  At risk for malnutrition (Comment)  Findings of the 6 clinical characteristics of malnutrition (Minimum of 2 out of 6 clinical characteristics is required to make the diagnosis of moderate or severe Protein Calorie Malnutrition based on AND/ASPEN Guidelines):  Energy Intake:  Mild decrease in energy intake (Comment)  Weight Loss:  No significant weight loss     Body Fat Loss:  No significant body fat loss     Muscle Mass Loss:  No significant muscle mass loss    Fluid Accumulation:  No significant fluid accumulation     Strength:  Not Performed    NUTRITION DIAGNOSIS   Inadequate oral intake related to inadequate protein-energy intake, increase demand for energy/nutrients as evidenced by poor intake prior to admission, wounds, weight loss (wt loss of 11% in 9 months)    Nutrition Monitoring and
Consult received   Labs reviewed   Discussed with staff
Lab called for Renal, Mg, and Lactic redraw @ 0154.  Will continue to monitor
Morning assessments and vital signs complete. Patient given scheduled medications as prescribed. Pharmacy notified of missing medications. Call received from patient's son/POA. Update of care given. Patient cleansed of incontinent episode. Preventative mepilex placed to coccyx. Call light is within reach. Patient denies pain at this time    1100- Secure message sent to pharmacy for clarification of of Mag IV order. Patient received a dose at 0630 by previous RN. Pharmacy will call this writer back with instructions if medications should be given. 1500- Patient noted to have incontinent episode. Complete linen, gown, and brief changed. New purick and system changed. 1630- Patient transferred from stretcher to bed. 1815- Report called to Tayler/DANIEL. Patient transported to room 6306 via bed with all belongings.
Orientation: A&Ox4, intermittent confusion    O2 Modality: 2L NC    Ambulation: bedrest     Skin: redness on coccyx, mepilex in place     Pain: pt denies     Gtts: NS @ 125    All needs met at this time. Bed in lowest position, bed alarm on, call light and overhead table within reach. Will continue to monitor.
Patient is discharging to [facility]. Patients Son was notified of discharge. Discharge instructions and medication instructions were discussed. Patient verbalized understanding. IV was removed with no complications noted.
Patient is off the floor getting a CT of her chest
Pharmacy  Note  - Admission Medication History    List of [id number] medications is complete. I reviewed Rx fill history via \"Complete Dispense Report\" in Epic, spoke to the patient, and reviewed the medication list sent by [facility]. The following changes made to [id number]-[id number]-[id number] medication list:    ADDED:   1) Tylenol  2) Oyster shell  3) Januvia  4) ammonium lactate     Dose or Frequency CHANGE:  1) We had on file that the patient was on lisinopril 5 mg daily but they are now taking lisinopril 10 mg daily. 2) We had on file that the patient was on metformin 500 mg twice daily but they are now taking 1000 mg twice daily.      Current Outpatient Medications   Medication Instructions    acetaminophen (TYLENOL) 650 mg, Oral, EVERY 6 HOURS PRN    ammonium lactate (LAC-HYDRIN) 12 % lotion Apply topically to legs and feet daily    aspirin 81 mg, Oral, DAILY    atorvastatin (LIPITOR) 20 mg, Oral, DAILY    Calcium Carb-Cholecalciferol ([employer] OYSTER SHELL CALCIUM-VIT D PO) Oral, 500 mg-200 u daily    hydroCHLOROthiazide (HYDRODIURIL) 25 mg, Oral, DAILY    latanoprost (XALATAN) 0.005 % ophthalmic solution 1 drop, Both Eyes, NIGHTLY    levothyroxine (SYNTHROID) 112 mcg, Oral, SIX TIMES WEEKLY, and 224 mcg once a week on wednesdays    lisinopril (PRINIVIL;ZESTRIL) 10 mg, Oral, Nightly    melatonin 1 MG tablet Oral, 1-2 tablets nightly as needed    metFORMIN (GLUCOPHAGE) 1,000 mg, Oral, 2 TIMES DAILY WITH MEALS    Multiple Vitamins-Minerals (CERTA-SIMONE PO) 1 tablet, Oral, DAILY    SITagliptin (JANUVIA) 50 mg, Oral, DAILY    timolol (TIMOPTIC) 0.5 % ophthalmic solution 1 drop, DAILY      3/27/2023 6:18 PM  Joann Martinez  PharmD Candidate Class of [employer]
Physician Progress Note      Iris Palencia  [facility] #:                  [id number]  :                       1945  ADMIT DATE:       3/27/2023 2:37 PM  DISCH DATE:  RESPONDING  PROVIDER #:        YVETTE LAFLEUR          QUERY TEXT:    Patient admitted with hypercalcemia. Noted documentation of acute respiratory   failure in H&P on 23 and IM progress note 23. In order to support   the diagnosis of acute respiratory failure, please include additional clinical   indicators in your documentation. Or please document if the diagnosis of   acute respiratory failure has been ruled out after further study. The medical record reflects the following:  Risk Factors: 76year old female  Clinical Indicators: Per  ED provider note- Effort: Pulmonary effort is   normal. No respiratory distress. Breath sounds: Normal breath sounds. No   wheezing, rhonchi or rales. Per  H&P- Acute hypoxic respiratory failure   w/ CAP. P/w AMS, requiring 2L O2. Per  IM progress note attestation-   chronic respiratory failure 2 L oxygen dependence. .. She is on 2 L oxygen   maintaining O2 sats greater than 95%. Treatment: Labs, imaging, vitals    Acute Respiratory Failure Clinical Indicators per  MS-DRG Training Guide and   Quick Reference Guide:  pO2 < 60 mmHg or SpO2 (pulse oximetry) < 91% breathing room air  pCO2 > 50 and pH < 7.35  P/F ratio (pO2 / FIO2) < 300  pO2 decrease or pCO2 increase by 10 mmHg from baseline (if known)  Supplemental oxygen of 40% or more  Presence of respiratory distress, tachypnea, dyspnea, shortness of breath,   wheezing  Unable to speak in complete sentences  Use of accessory muscles to breathe  Extreme anxiety and feeling of impending doom  Tripod position  Confusion/altered mental status/obtunded  Options provided:  -- Acute Respiratory Failure as evidenced by, Please document evidence.   -- Acute Respiratory Failure ruled out after study and Chronic
Progress Note    Admit Date: 3/27/2023  Day: Hospital Day: 3  Diet: ADULT DIET; Regular; 4 carb choices (60 gm/meal)  ADULT ORAL NUTRITION SUPPLEMENT; AM Snack, PM Snack; Standard High Calorie/High Protein Oral Supplement    CC: AMS    Interval history:   NAEO. Pt reports improved rest overnight and denies any headache, chest pain, dyspnea, n/v. Her only complaint is difficulty eating due to PIV in L hand. Medications:     Scheduled Meds:   melatonin  6 mg Oral Nightly    cefTRIAXone (ROCEPHIN) IV  1,000 mg IntraVENous Q24H    aspirin  81 mg Oral Daily    atorvastatin  20 mg Oral Daily    latanoprost  1 drop Both Eyes Nightly    levothyroxine  100 mcg Oral Daily    lisinopril  10 mg Oral Nightly    timolol  1 drop Both Eyes Daily    sodium chloride flush  5-40 mL IntraVENous 2 times per day    enoxaparin  40 mg SubCUTAneous Daily    insulin lispro  0-8 Units SubCUTAneous TID WC    insulin lispro  0-4 Units SubCUTAneous Nightly     Continuous Infusions:   sodium chloride      dextrose      sodium chloride 125 mL/hr at 03/29/23 0332     PRN Meds:ammonium lactate, sodium chloride flush, sodium chloride, ondansetron **OR** ondansetron, polyethylene glycol, acetaminophen **OR** acetaminophen, glucose, dextrose bolus **OR** dextrose bolus, glucagon (rDNA), dextrose    Objective:   Vitals:   T-max:  Patient Vitals for the past 8 hrs:   BP Temp Temp src Pulse Resp SpO2 Weight   03/29/23 0738 (!) 146/76 99 °F (37.2 °C) Oral 79 18 93 % --   03/29/23 0626 -- -- -- -- -- -- 149 lb 7.6 oz (67.8 kg)   03/29/23 0313 120/69 97.4 °F (36.3 °C) Axillary 78 16 95 % --         Intake/Output Summary (Last 24 hours) at 3/29/2023 0818  Last data filed at 3/29/2023 8174  Gross per 24 hour   Intake 1592 ml   Output 2750 ml   Net -1158 ml         Physical Exam  Vitals and nursing note reviewed. Constitutional:       General: She is not in acute distress. Appearance: Normal appearance. She is not ill-appearing or diaphoretic.
Pt arrived to 0756. Alert x3 unsure of year but able to guess correctly. Pt states she is from [facility]. Fall precautions in place. Bed alarm on and patient oriented to room.
The repeated Potassium and Magnesium result still low after correction was given in ED, informed to MD awaiting for any further orders.
and regular rhythm. Pulses: Normal pulses. Heart sounds: Normal heart sounds. No murmur heard. No friction rub. No gallop. Pulmonary:      Effort: Pulmonary effort is normal. No respiratory distress. Breath sounds: Normal breath sounds. No wheezing, rhonchi or rales. Skin:     General: Skin is warm and dry. Coloration: Skin is not pale. Neurological:      Mental Status: She is alert. Comments: RLE weakness compared to LLE; At baseline per pt. Oriented to self, place, month. LABS:    CBC:   Recent Labs     03/27/23 1615 03/28/23 0459 03/29/23  0724   WBC 11.0 7.7 7.5   HGB 12.3 11.2* 10.7*   HCT 38.1 33.5* 32.1*    137 129*   MCV 79.4* 80.1 79.4*       Renal:    Recent Labs     03/28/23 1932 03/29/23  0724 03/29/23  1818    140 135*   K 3.4* 3.5 4.5   CL 96* 99 96*   CO2 35* 35* 29   BUN 17 15 16   CREATININE <0.5* 0.5* 0.5*   GLUCOSE 191* 167* 139*   CALCIUM 10.3 10.0 10.2   MG 1.60* 1.90 1.80   PHOS 3.5 3.4 3.9   ANIONGAP 8 6 10       Hepatic:   Recent Labs     03/27/23 1615 03/27/23 2359 03/28/23 1932 03/29/23  0724 03/29/23  1818   AST 16  --   --   --   --    ALT 12  --   --   --   --    BILITOT 0.5  --   --   --   --    PROT 6.8  --   --   --   --    LABALBU 3.4   < > 3.1* 3.0* 2.7*   ALKPHOS 61  --   --   --   --     < > = values in this interval not displayed. Troponin:   Recent Labs     03/27/23 1615   TROPONINI <0.01       BNP: No results for input(s): BNP in the last 72 hours. Lipids: No results for input(s): CHOL, HDL in the last 72 hours. Invalid input(s): LDLCALCU, TRIGLYCERIDE  ABGs:  No results for input(s): PHART, SDC3RZM, PO2ART, AAA5MQZ, BEART, THGBART, M0KFKDTJ, FUY3LZQ in the last 72 hours. INR: No results for input(s): INR in the last 72 hours. Lactate: No results for input(s): LACTATE in the last 72 hours.   Cultures:  -----------------------------------------------------------------  RAD:   CT Head W/O Contrast   Final
years ago. She has never used smokeless tobacco. She reports current alcohol use. She reports that she does not use drugs.     Allergies:  Adhesive tape, Bactrim [sulfamethoxazole-trimethoprim], Cephalexin, Ciprofloxacin, Florinef [fludrocortisone], Other, Penicillins, Sulfa antibiotics, Trimethoprim, and Vancomycin    Current Medications:    potassium bicarb-citric acid (EFFER-K) effervescent tablet 40 mEq, Once  melatonin tablet 6 mg, Nightly  cefTRIAXone (ROCEPHIN) 1,000 mg in sodium chloride 0.9 % 50 mL IVPB (mini-bag), Q24H  ammonium lactate (LAC-HYDRIN) 12 % lotion, Daily PRN  aspirin chewable tablet 81 mg, Daily  atorvastatin (LIPITOR) tablet 20 mg, Daily  latanoprost (XALATAN) 0.005 % ophthalmic solution 1 drop, Nightly  levothyroxine (SYNTHROID) tablet 100 mcg, Daily  lisinopril (PRINIVIL;ZESTRIL) tablet 10 mg, Nightly  timolol (TIMOPTIC) 0.5 % ophthalmic solution 1 drop, Daily  sodium chloride flush 0.9 % injection 5-40 mL, 2 times per day  sodium chloride flush 0.9 % injection 5-40 mL, PRN  0.9 % sodium chloride infusion, PRN  enoxaparin (LOVENOX) injection 40 mg, Daily  ondansetron (ZOFRAN-ODT) disintegrating tablet 4 mg, Q8H PRN   Or  ondansetron (ZOFRAN) injection 4 mg, Q6H PRN  polyethylene glycol (GLYCOLAX) packet 17 g, Daily PRN  acetaminophen (TYLENOL) tablet 650 mg, Q6H PRN   Or  acetaminophen (TYLENOL) suppository 650 mg, Q6H PRN  insulin lispro (1 Unit Dial) (HUMALOG/ADMELOG) pen 0-8 Units, TID WC  insulin lispro (1 Unit Dial) (HUMALOG/ADMELOG) pen 0-4 Units, Nightly  glucose chewable tablet 16 g, PRN  dextrose bolus 10% 125 mL, PRN   Or  dextrose bolus 10% 250 mL, PRN  glucagon injection 1 mg, PRN  dextrose 10 % infusion, Continuous PRN  0.9 % sodium chloride infusion, Continuous      Review of Systems:   14 point ROS obtained but were negative except mentioned in HPI      Physical exam:     Vitals:  BP (!) 146/76   Pulse 79   Temp 99 °F (37.2 °C) (Oral)   Resp 18   Ht 5' 3\" (1.6 m)   Wt
1. Patchy infrahilar and bibasilar accentuated markings or airspace disease with some bronchial thickening. New compared to prior      2. No sign of pneumothorax. Assessment/Plan   1. Hypercalcemia - Resolved  - S/p pamidronate x 1 dose  - Phosphorus & PTH low  - PTHrp pending   - Off IVF    2. HTN    3. Anemia    4. Acid- base/ Electrolyte imbalance   - Requiring K & Mag replacement     5. DM2    6. AMS / UTI  - Rocephin     7. CAP      Plan:  - Ca remains stable off IVF  - PTHrp pending  - 1,25 Vit D elevated in 2013   - check 1,25 vit D   - ACE levels   - CT chest   - Continue K & Mg supplementation   - Continue to monitor renal labs       Recommend to dose adjust all medications  based on renal functions  Maintain SBP> 90 mmHg   Daily weights   AVOID NSAIDs  Avoid Nephrotoxins  Monitor Intake/Output  Call if significant decrease in urine output       Bautista Renner PA-C  Feel free to contact me   [facility] [address]  Office : [phone]  Fax :[phone]      I have seen the patient independently from the PA. I discussed the care with the PA . I personally reviewed the HPI, PH, FH, SH, ROS and medications. I repeated pertinent portions of the examination and reviewed the relevant imaging and laboratory data.  I agree with the findings, assessment and plan as documented, with the following addendum:      Huy Loya MD
1.0.  - Daily RFP  - Replete prn     Chronic Issues  DM: MDSS, hypoglycemia protocol  HLD: Home lipitor  Glaucoma: home latanoprost, timolol eyedrops     Code Status: DNR-CCA  FEN: Carb controlled diet                     PPX: Lovenox  DISPO: [address]  Internal Medicine Resident  PGY-1    This patient has been staffed and discussed with Ludmila Martinez MD.

## 2023-03-30 NOTE — PLAN OF CARE
Problem: Safety - Adult  Goal: Free from fall injury  Outcome: Adequate for Discharge     Problem: Pain  Goal: Verbalizes/displays adequate comfort level or baseline comfort level  Outcome: Adequate for Discharge     Problem: Skin/Tissue Integrity  Goal: Absence of new skin breakdown  Description: 1. Monitor for areas of redness and/or skin breakdown  2. Assess vascular access sites hourly  3. Every 4-6 hours minimum:  Change oxygen saturation probe site  4. Every 4-6 hours:  If on nasal continuous positive airway pressure, respiratory therapy assess nares and determine need for appliance change or resting period.   Outcome: Adequate for Discharge     Problem: Discharge Planning  Goal: Discharge to home or other facility with appropriate resources  Outcome: Adequate for Discharge     Problem: Respiratory - Adult  Goal: Achieves optimal ventilation and oxygenation  Outcome: Adequate for Discharge     Problem: Chronic Conditions and Co-morbidities  Goal: Patient's chronic conditions and co-morbidity symptoms are monitored and maintained or improved  Outcome: Adequate for Discharge     Problem: Nutrition Deficit:  Goal: Optimize nutritional status  Outcome: Adequate for Discharge

## 2023-03-30 NOTE — DISCHARGE INSTR - COC
(Active)   Number of days: 2551       Incision 16 Leg Right (Active)   Number of days: 7440       Wound 23 Coccyx (Active)   Wound Etiology Pressure Stage 2 23 0422   Number of days: 1       Wound 23 Heel Left (Active)   Wound Etiology Pressure Stage 1 23 0422   Dressing/Treatment Foam 23 1855   Number of days: 1       Wound 23 Heel Right (Active)   Wound Etiology Pressure Stage 1 23 0422   Dressing/Treatment Foam 23 1855   Number of days: 1        Elimination:  Continence: Bowel: {YES / LQ:27247}  Bladder: {YES / CD:83709}  Urinary Catheter: {Urinary Catheter:292182220}   Colostomy/Ileostomy/Ileal Conduit: {YES / SS:14504}       Date of Last BM: ***    Intake/Output Summary (Last 24 hours) at 3/30/2023 1143  Last data filed at 3/30/2023 0630  Gross per 24 hour   Intake 180 ml   Output 2050 ml   Net -1870 ml     I/O last 3 completed shifts:   In:  [P.O.:280; I.V.:1012]  Out: 2800 [Urine:2800]    Safety Concerns:     [address] [employer] Safety Concerns:679623136}    Impairments/Disabilities:      [address] [employer] Impairments/Disabilities:373403268}    Nutrition Therapy:  Current Nutrition Therapy:   [address] [employer] Diet List:661853455}    Routes of Feeding: {CHP DME Other Feedings:541578349}  Liquids: {Slp liquid thickness:59403}  Daily Fluid Restriction: {CHP DME Yes amt example:600904139}  Last Modified Barium Swallow with Video (Video Swallowing Test): {Done Not Done DODX:192058469}    Treatments at the Time of Hospital Discharge:   Respiratory Treatments: ***  Oxygen Therapy:  {Therapy; copd oxygen:97845}  Ventilator:    { BIMAL Vent CGFW:641223730}    Rehab Therapies: {THERAPEUTIC INTERVENTION:9730249387}  Weight Bearing Status/Restrictions: [address] [facility] Weight Bearin}  Other Medical Equipment (for information only, NOT a DME order):  {EQUIPMENT:703075075}  Other Treatments: ***    Patient's personal belongings (please select all that are sent with patient):  {P DME

## 2023-03-30 NOTE — CARE COORDINATION
Case Management Assessment            Discharge Note                    Date / Time of Note: 3/30/2023 1:53 PM                  Discharge Note Completed by: EDINSON Walls, LSW    Patient Name: Ben Keyes   YOB: 1945  Diagnosis: Hypercalcemia [E83.52]  Hypomagnesemia [E83.42]  Altered mental status, unspecified altered mental status type [R41.82]   Date / Time: 3/27/2023  2:37 PM    Current PCP: Hai Mcintosh MD  Clinic patient: No    Hospitalization in the last 30 days: No       Advance Directives:  Code Status: DNR-CCA  [facility] DNR form completed and on chart: Yes    Financial:  Payor: Wilma Haynes / Plan: Edy Matthews / Product Type: *No Product type* /      [facility]    [facility] [address] [address]  Phone: [phone] Fax: [phone]      Assistance purchasing medications?: Potential Assistance Purchasing Medications: No  Assistance provided by Case Management: None at this time    Does patient want to participate in local refill/ meds to beds program?: No    Meds To Beds General Rules:  1. Can ONLY be done Monday- Friday between 8:30am-5pm  2. Prescription(s) must be in pharmacy by 3pm to be filled same day  3. Copy of patient's insurance/ prescription drug card and patient face sheet must be sent along with the prescription(s)  4. Cost of Rx cannot be added to hospital bill. If financial assistance is needed, please contact unit  or ;  or  CANNOT provide pharmacy voucher for patients co-pays  5.  Patients can then  the prescription on their way out of the hospital at discharge, or pharmacy can deliver to the bedside if staff is available. (payment due at time of pick-up or delivery - cash, check, or card accepted)     Able to afford home medications/ co-pay costs: Yes    ADLS:  Current PT

## 2023-03-30 NOTE — DISCHARGE INSTRUCTIONS
Thank you for visiting [facility]    Your medications have changed. Please start taking the following:  Cephalexin (Keflex) Take 500mg by mouth twice daily for 4 days    Please follow up with your primary care physician in 1 week.

## 2023-03-31 LAB
1,25(OH)2D3 SERPL-MCNC: 49.8 PG/ML (ref 19.9–79.3)
ACE SERPL-CCNC: <10 U/L (ref 16–85)
LEGIONELLA AG UR QL: NORMAL
REPORT: NORMAL
RESP PATH DNA+RNA PNL NPH NAA+NON-PROBE: NORMAL
S PNEUM AG UR QL: NORMAL

## 2023-04-03 LAB — PTH RELATED PROT SERPL-SCNC: 2 PMOL/L (ref 0–3.4)
